# Patient Record
Sex: FEMALE | Race: WHITE | NOT HISPANIC OR LATINO | Employment: FULL TIME | URBAN - METROPOLITAN AREA
[De-identification: names, ages, dates, MRNs, and addresses within clinical notes are randomized per-mention and may not be internally consistent; named-entity substitution may affect disease eponyms.]

---

## 2020-01-22 ENCOUNTER — OFFICE VISIT (OUTPATIENT)
Dept: FAMILY MEDICINE CLINIC | Facility: CLINIC | Age: 40
End: 2020-01-22

## 2020-01-22 VITALS
BODY MASS INDEX: 29.47 KG/M2 | DIASTOLIC BLOOD PRESSURE: 68 MMHG | OXYGEN SATURATION: 97 % | TEMPERATURE: 98 F | SYSTOLIC BLOOD PRESSURE: 122 MMHG | HEART RATE: 66 BPM | HEIGHT: 67 IN | WEIGHT: 187.8 LBS

## 2020-01-22 DIAGNOSIS — R14.0 ABDOMINAL BLOATING: ICD-10-CM

## 2020-01-22 DIAGNOSIS — K64.4 EXTERNAL HEMORRHOIDS: Primary | ICD-10-CM

## 2020-01-22 DIAGNOSIS — R10.11 RIGHT UPPER QUADRANT ABDOMINAL PAIN: ICD-10-CM

## 2020-01-22 DIAGNOSIS — R11.2 NAUSEA AND VOMITING, INTRACTABILITY OF VOMITING NOT SPECIFIED, UNSPECIFIED VOMITING TYPE: ICD-10-CM

## 2020-01-22 DIAGNOSIS — D50.0 IRON DEFICIENCY ANEMIA DUE TO CHRONIC BLOOD LOSS: ICD-10-CM

## 2020-01-22 DIAGNOSIS — K59.00 CONSTIPATION, UNSPECIFIED CONSTIPATION TYPE: ICD-10-CM

## 2020-01-22 LAB
ALBUMIN SERPL-MCNC: 4.8 G/DL (ref 3.5–5.2)
ALBUMIN/GLOB SERPL: 2.4 G/DL
ALP SERPL-CCNC: 32 U/L (ref 39–117)
ALT SERPL-CCNC: 11 U/L (ref 1–33)
AMYLASE SERPL-CCNC: 42 U/L (ref 28–100)
AST SERPL-CCNC: 13 U/L (ref 1–32)
BASOPHILS # BLD AUTO: 0.06 10*3/MM3 (ref 0–0.2)
BASOPHILS NFR BLD AUTO: 1 % (ref 0–1.5)
BILIRUB SERPL-MCNC: 0.4 MG/DL (ref 0.2–1.2)
BUN SERPL-MCNC: 12 MG/DL (ref 6–20)
BUN/CREAT SERPL: 16.7 (ref 7–25)
CALCIUM SERPL-MCNC: 9.5 MG/DL (ref 8.6–10.5)
CHLORIDE SERPL-SCNC: 101 MMOL/L (ref 98–107)
CO2 SERPL-SCNC: 24.9 MMOL/L (ref 22–29)
CREAT SERPL-MCNC: 0.72 MG/DL (ref 0.57–1)
EOSINOPHIL # BLD AUTO: 0.06 10*3/MM3 (ref 0–0.4)
EOSINOPHIL NFR BLD AUTO: 1 % (ref 0.3–6.2)
ERYTHROCYTE [DISTWIDTH] IN BLOOD BY AUTOMATED COUNT: 12.6 % (ref 12.3–15.4)
GLOBULIN SER CALC-MCNC: 2 GM/DL
GLUCOSE SERPL-MCNC: 88 MG/DL (ref 65–99)
HCT VFR BLD AUTO: 41.8 % (ref 34–46.6)
HGB BLD-MCNC: 13.7 G/DL (ref 12–15.9)
IMM GRANULOCYTES # BLD AUTO: 0.02 10*3/MM3 (ref 0–0.05)
IMM GRANULOCYTES NFR BLD AUTO: 0.3 % (ref 0–0.5)
LIPASE SERPL-CCNC: 33 U/L (ref 13–60)
LYMPHOCYTES # BLD AUTO: 2.22 10*3/MM3 (ref 0.7–3.1)
LYMPHOCYTES NFR BLD AUTO: 35.2 % (ref 19.6–45.3)
MCH RBC QN AUTO: 28.5 PG (ref 26.6–33)
MCHC RBC AUTO-ENTMCNC: 32.8 G/DL (ref 31.5–35.7)
MCV RBC AUTO: 86.9 FL (ref 79–97)
MONOCYTES # BLD AUTO: 0.36 10*3/MM3 (ref 0.1–0.9)
MONOCYTES NFR BLD AUTO: 5.7 % (ref 5–12)
NEUTROPHILS # BLD AUTO: 3.59 10*3/MM3 (ref 1.7–7)
NEUTROPHILS NFR BLD AUTO: 56.8 % (ref 42.7–76)
NRBC BLD AUTO-RTO: 0 /100 WBC (ref 0–0.2)
PLATELET # BLD AUTO: 340 10*3/MM3 (ref 140–450)
POTASSIUM SERPL-SCNC: 4.4 MMOL/L (ref 3.5–5.2)
PROT SERPL-MCNC: 6.8 G/DL (ref 6–8.5)
RBC # BLD AUTO: 4.81 10*6/MM3 (ref 3.77–5.28)
SODIUM SERPL-SCNC: 140 MMOL/L (ref 136–145)
WBC # BLD AUTO: 6.31 10*3/MM3 (ref 3.4–10.8)

## 2020-01-22 PROCEDURE — 99214 OFFICE O/P EST MOD 30 MIN: CPT | Performed by: FAMILY MEDICINE

## 2020-01-22 RX ORDER — HYDROCORTISONE ACETATE 25 MG/1
25 SUPPOSITORY RECTAL 2 TIMES DAILY PRN
Qty: 12 SUPPOSITORY | Refills: 1 | Status: SHIPPED | OUTPATIENT
Start: 2020-01-22 | End: 2020-02-04

## 2020-01-22 NOTE — PATIENT INSTRUCTIONS
I have advised the patient to consider seeing GYN to have a more permanent solution to her iron deficiency anemia from her heavy periods.    I also recommended the patient use Benefiber daily to have at least 1 or 2 effortless bowel movements a day.      We will treat the patient's hemorrhoid topically but I advised her that if it does not improve over the next week that she should contact my office      High-Fiber Diet  Fiber, also called dietary fiber, is a type of carbohydrate that is found in fruits, vegetables, whole grains, and beans. A high-fiber diet can have many health benefits. Your health care provider may recommend a high-fiber diet to help:  · Prevent constipation. Fiber can make your bowel movements more regular.  · Lower your cholesterol.  · Relieve the following conditions:  ? Swelling of veins in the anus (hemorrhoids).  ? Swelling and irritation (inflammation) of specific areas of the digestive tract (uncomplicated diverticulosis).  ? A problem of the large intestine (colon) that sometimes causes pain and diarrhea (irritable bowel syndrome, IBS).  · Prevent overeating as part of a weight-loss plan.  · Prevent heart disease, type 2 diabetes, and certain cancers.  What is my plan?  The recommended daily fiber intake in grams (g) includes:  · 38 g for men age 50 or younger.  · 30 g for men over age 50.  · 25 g for women age 50 or younger.  · 21 g for women over age 50.  You can get the recommended daily intake of dietary fiber by:  · Eating a variety of fruits, vegetables, grains, and beans.  · Taking a fiber supplement, if it is not possible to get enough fiber through your diet.  What do I need to know about a high-fiber diet?  · It is better to get fiber through food sources rather than from fiber supplements. There is not a lot of research about how effective supplements are.  · Always check the fiber content on the nutrition facts label of any prepackaged food. Look for foods that contain 5 g of  fiber or more per serving.  · Talk with a diet and nutrition specialist (dietitian) if you have questions about specific foods that are recommended or not recommended for your medical condition, especially if those foods are not listed below.  · Gradually increase how much fiber you consume. If you increase your intake of dietary fiber too quickly, you may have bloating, cramping, or gas.  · Drink plenty of water. Water helps you to digest fiber.  What are tips for following this plan?  · Eat a wide variety of high-fiber foods.  · Make sure that half of the grains that you eat each day are whole grains.  · Eat breads and cereals that are made with whole-grain flour instead of refined flour or white flour.  · Eat brown rice, bulgur wheat, or millet instead of white rice.  · Start the day with a breakfast that is high in fiber, such as a cereal that contains 5 g of fiber or more per serving.  · Use beans in place of meat in soups, salads, and pasta dishes.  · Eat high-fiber snacks, such as berries, raw vegetables, nuts, and popcorn.  · Choose whole fruits and vegetables instead of processed forms like juice or sauce.  What foods can I eat?    Fruits  Berries. Pears. Apples. Oranges. Avocado. Prunes and raisins. Dried figs.  Vegetables  Sweet potatoes. Spinach. Kale. Artichokes. Cabbage. Broccoli. Cauliflower. Green peas. Carrots. Squash.  Grains  Whole-grain breads. Multigrain cereal. Oats and oatmeal. Brown rice. Barley. Bulgur wheat. Millet. Quinoa. Bran muffins. Popcorn. Rye wafer crackers.  Meats and other proteins  Navy, kidney, and rouse beans. Soybeans. Split peas. Lentils. Nuts and seeds.  Dairy  Fiber-fortified yogurt.  Beverages  Fiber-fortified soy milk. Fiber-fortified orange juice.  Other foods  Fiber bars.  The items listed above may not be a complete list of recommended foods and beverages. Contact a dietitian for more options.  What foods are not recommended?  Fruits  Fruit juice. Cooked, strained  fruit.  Vegetables  Fried potatoes. Canned vegetables. Well-cooked vegetables.  Grains  White bread. Pasta made with refined flour. White rice.  Meats and other proteins  Fatty cuts of meat. Fried chicken or fried fish.  Dairy  Milk. Yogurt. Cream cheese. Sour cream.  Fats and oils  [place].  Beverages  Soft drinks.  Other foods  Cakes and pastries.  The items listed above may not be a complete list of foods and beverages to avoid. Contact a dietitian for more information.  Summary  · Fiber is a type of carbohydrate. It is found in fruits, vegetables, whole grains, and beans.  · There are many health benefits of eating a high-fiber diet, such as preventing constipation, lowering blood cholesterol, helping with weight loss, and reducing your risk of heart disease, diabetes, and certain cancers.  · Gradually increase your intake of fiber. Increasing too fast can result in cramping, bloating, and gas. Drink plenty of water while you increase your fiber.  · The best sources of fiber include whole fruits and vegetables, whole grains, nuts, seeds, and beans.  This information is not intended to replace advice given to you by your health care provider. Make sure you discuss any questions you have with your health care provider.  Document Released: 12/18/2006 Document Revised: 10/22/2018 Document Reviewed: 10/22/2018  [employer] Interactive Patient Education © 2019 [employer]

## 2020-01-22 NOTE — PROGRESS NOTES
Subjective   Cele Alvarez is a 39 y.o. female.     Chief Complaint   Patient presents with   • Hemorrhoids       Pt is here today for a new problem.  She started with hemorrhoids about 7 years ago after the birth of her son.  The patient has a history of DU B and iron deficiency anemia and has also needed to take iron intermittently in her lifetime.  This is caused intermittent constipation which the patient has not necessarily offset with hydration and fiber.  So therefore she has suffered intermittently with hemorrhoids.  Sometimes they bleed but mostly they hurt.  She states that currently she has been having pain and difficulty when sitting down.  Patient last took iron 3 months ago.  LMP 1/5/2020, and she is still having monthly periods.    Patient is also experiencing new abdominal issues.  For the past couple of months she has had some intermittent episodes of abdominal pain.  Most of the episodes have occurred in the middle the night and wake her from sleep.  She feels abdominal pain and then feels that she needs to have a bowel movement.  However if she is unable to have a bowel movement she will get nauseous and has vomited at times.  Bowel movements do tend to help relieve the pain.  Patient states that overall she has had about 4 episodes as described above.  Also more commonly at other times after eating the patient will notice some bloating, abdominal pain, and increased gas.  These are to a lesser degree and occur more frequently than the episodes that occur in the middle the night.  Patient does still have her gallbladder.       Review of Systems   Constitutional: Negative for activity change, chills, fatigue and fever.   HENT: Negative for hearing loss, swollen glands, tinnitus and trouble swallowing.    Eyes: Negative for pain and visual disturbance.   Respiratory: Negative for cough and shortness of breath.    Cardiovascular: Negative for chest pain, palpitations and leg swelling.  "  Gastrointestinal: Positive for anal bleeding and constipation. Negative for diarrhea and nausea.   Endocrine: Negative for polydipsia and polyuria.   Genitourinary: Negative for difficulty urinating and urinary incontinence.   Musculoskeletal: Negative for arthralgias, gait problem and joint swelling.   Skin: Negative for rash.   Allergic/Immunologic: Negative for immunocompromised state.   Neurological: Negative for dizziness, light-headedness and headache.   Hematological: Negative for adenopathy. Does not bruise/bleed easily.   Psychiatric/Behavioral: Negative for dysphoric mood and sleep disturbance.       The following portions of the patient's history were reviewed and updated as appropriate: allergies, current medications, past family history, past medical history, past social history, past surgical history and problem list.    History reviewed. No pertinent past medical history.    History reviewed. No pertinent surgical history.    History reviewed. No pertinent family history.    Social History     Socioeconomic History   • Marital status:      Spouse name: Not on file   • Number of children: Not on file   • Years of education: Not on file   • Highest education level: Not on file   Tobacco Use   • Smoking status: Never Smoker   • Smokeless tobacco: Never Used   Substance and Sexual Activity   • Alcohol use: Never     Frequency: Never   • Drug use: Never         Current Outpatient Medications:   •  hydrocortisone (ANUSOL-HC) 25 MG suppository, Insert 1 suppository into the rectum 2 (Two) Times a Day As Needed for Hemorrhoids., Disp: 12 suppository, Rfl: 1    Objective     Vitals:    01/22/20 0823   BP: 122/68   Pulse: 66   Temp: 98 °F (36.7 °C)   SpO2: 97%   Weight: 85.2 kg (187 lb 12.8 oz)   Height: 170.2 cm (67\")       Body mass index is 29.41 kg/m².    No components found for: 2D    Physical Exam   Constitutional: She is oriented to person, place, and time. She appears well-developed and " well-nourished.   HENT:   Head: Normocephalic and atraumatic.   Eyes: Conjunctivae are normal.   Neck: Normal range of motion. Neck supple.   Cardiovascular: Normal rate, regular rhythm, normal heart sounds and intact distal pulses.   Pulmonary/Chest: Effort normal and breath sounds normal.   Abdominal: Soft. Bowel sounds are normal. She exhibits no distension and no mass. There is tenderness (Mild right upper quadrant tenderness to palpation.). There is no rebound and no guarding. No hernia.   Genitourinary: Rectal exam shows external hemorrhoid (1.5 cm nonthrombosed external hemorrhoid noted).   Musculoskeletal: Normal range of motion. She exhibits no edema.   Neurological: She is alert and oriented to person, place, and time.   Skin: Skin is warm and dry. Capillary refill takes less than 2 seconds. No rash noted.   Psychiatric: She has a normal mood and affect. Her behavior is normal. Judgment and thought content normal.   Nursing note and vitals reviewed.      Procedures    Assessment/Plan   Cele was seen today for hemorrhoids.    Diagnoses and all orders for this visit:    External hemorrhoids  -     hydrocortisone (ANUSOL-HC) 25 MG suppository; Insert 1 suppository into the rectum 2 (Two) Times a Day As Needed for Hemorrhoids.    Constipation, unspecified constipation type  -     US Gallbladder; Future    Iron deficiency anemia due to chronic blood loss  -     CBC & Differential    Right upper quadrant abdominal pain  -     Comprehensive Metabolic Panel  -     CBC & Differential  -     Lipase  -     Amylase  -     US Gallbladder; Future    Nausea and vomiting, intractability of vomiting not specified, unspecified vomiting type  -     US Gallbladder; Future    Abdominal bloating  -     US Gallbladder; Future        Patient Instructions   I have advised the patient to consider seeing GYN to have a more permanent solution to her iron deficiency anemia from her heavy periods.    I also recommended the patient use  Benefiber daily to have at least 1 or 2 effortless bowel movements a day.      We will treat the patient's hemorrhoid topically but I advised her that if it does not improve over the next week that she should contact my office      High-Fiber Diet  Fiber, also called dietary fiber, is a type of carbohydrate that is found in fruits, vegetables, whole grains, and beans. A high-fiber diet can have many health benefits. Your health care provider may recommend a high-fiber diet to help:  · Prevent constipation. Fiber can make your bowel movements more regular.  · Lower your cholesterol.  · Relieve the following conditions:  ? Swelling of veins in the anus (hemorrhoids).  ? Swelling and irritation (inflammation) of specific areas of the digestive tract (uncomplicated diverticulosis).  ? A problem of the large intestine (colon) that sometimes causes pain and diarrhea (irritable bowel syndrome, IBS).  · Prevent overeating as part of a weight-loss plan.  · Prevent heart disease, type 2 diabetes, and certain cancers.  What is my plan?  The recommended daily fiber intake in grams (g) includes:  · 38 g for men age 50 or younger.  · 30 g for men over age 50.  · 25 g for women age 50 or younger.  · 21 g for women over age 50.  You can get the recommended daily intake of dietary fiber by:  · Eating a variety of fruits, vegetables, grains, and beans.  · Taking a fiber supplement, if it is not possible to get enough fiber through your diet.  What do I need to know about a high-fiber diet?  · It is better to get fiber through food sources rather than from fiber supplements. There is not a lot of research about how effective supplements are.  · Always check the fiber content on the nutrition facts label of any prepackaged food. Look for foods that contain 5 g of fiber or more per serving.  · Talk with a diet and nutrition specialist (dietitian) if you have questions about specific foods that are recommended or not recommended for your  medical condition, especially if those foods are not listed below.  · Gradually increase how much fiber you consume. If you increase your intake of dietary fiber too quickly, you may have bloating, cramping, or gas.  · Drink plenty of water. Water helps you to digest fiber.  What are tips for following this plan?  · Eat a wide variety of high-fiber foods.  · Make sure that half of the grains that you eat each day are whole grains.  · Eat breads and cereals that are made with whole-grain flour instead of refined flour or white flour.  · Eat brown rice, bulgur wheat, or millet instead of white rice.  · Start the day with a breakfast that is high in fiber, such as a cereal that contains 5 g of fiber or more per serving.  · Use beans in place of meat in soups, salads, and pasta dishes.  · Eat high-fiber snacks, such as berries, raw vegetables, nuts, and popcorn.  · Choose whole fruits and vegetables instead of processed forms like juice or sauce.  What foods can I eat?    Fruits  Berries. Pears. Apples. Oranges. Avocado. Prunes and raisins. Dried figs.  Vegetables  Sweet potatoes. Spinach. Kale. Artichokes. Cabbage. Broccoli. Cauliflower. Green peas. Carrots. Squash.  Grains  Whole-grain breads. Multigrain cereal. Oats and oatmeal. Brown rice. Barley. Bulgur wheat. Millet. Quinoa. Bran muffins. Popcorn. Rye wafer crackers.  Meats and other proteins  Navy, kidney, and rouse beans. Soybeans. Split peas. Lentils. Nuts and seeds.  Dairy  Fiber-fortified yogurt.  Beverages  Fiber-fortified soy milk. Fiber-fortified orange juice.  Other foods  Fiber bars.  The items listed above may not be a complete list of recommended foods and beverages. Contact a dietitian for more options.  What foods are not recommended?  Fruits  Fruit juice. Cooked, strained fruit.  Vegetables  Fried potatoes. Canned vegetables. Well-cooked vegetables.  Grains  White bread. Pasta made with refined flour. White rice.  Meats and other proteins  Fatty  cuts of meat. Fried chicken or fried fish.  Dairy  Milk. Yogurt. Cream cheese. Sour cream.  Fats and oils  [place].  Beverages  Soft drinks.  Other foods  Cakes and pastries.  The items listed above may not be a complete list of foods and beverages to avoid. Contact a dietitian for more information.  Summary  · Fiber is a type of carbohydrate. It is found in fruits, vegetables, whole grains, and beans.  · There are many health benefits of eating a high-fiber diet, such as preventing constipation, lowering blood cholesterol, helping with weight loss, and reducing your risk of heart disease, diabetes, and certain cancers.  · Gradually increase your intake of fiber. Increasing too fast can result in cramping, bloating, and gas. Drink plenty of water while you increase your fiber.  · The best sources of fiber include whole fruits and vegetables, whole grains, nuts, seeds, and beans.  This information is not intended to replace advice given to you by your health care provider. Make sure you discuss any questions you have with your health care provider.  Document Released: 12/18/2006 Document Revised: 10/22/2018 Document Reviewed: 10/22/2018  [employer] Interactive Patient Education © 2019 [employer]

## 2020-01-28 ENCOUNTER — HOSPITAL ENCOUNTER (OUTPATIENT)
Dept: ULTRASOUND IMAGING | Facility: HOSPITAL | Age: 40
Discharge: HOME | End: 2020-01-28
Admitting: FAMILY MEDICINE

## 2020-01-28 DIAGNOSIS — R10.11 RIGHT UPPER QUADRANT ABDOMINAL PAIN: ICD-10-CM

## 2020-01-28 DIAGNOSIS — R11.2 NAUSEA AND VOMITING, INTRACTABILITY OF VOMITING NOT SPECIFIED, UNSPECIFIED VOMITING TYPE: ICD-10-CM

## 2020-01-28 DIAGNOSIS — R14.0 ABDOMINAL BLOATING: ICD-10-CM

## 2020-01-28 DIAGNOSIS — K59.00 CONSTIPATION, UNSPECIFIED CONSTIPATION TYPE: ICD-10-CM

## 2020-01-28 PROCEDURE — 76705 ECHO EXAM OF ABDOMEN: CPT

## 2020-02-04 ENCOUNTER — OFFICE VISIT (OUTPATIENT)
Dept: FAMILY MEDICINE CLINIC | Facility: CLINIC | Age: 40
End: 2020-02-04

## 2020-02-04 VITALS
BODY MASS INDEX: 28.79 KG/M2 | HEIGHT: 67 IN | TEMPERATURE: 98.5 F | OXYGEN SATURATION: 97 % | DIASTOLIC BLOOD PRESSURE: 72 MMHG | HEART RATE: 65 BPM | SYSTOLIC BLOOD PRESSURE: 116 MMHG | WEIGHT: 183.4 LBS

## 2020-02-04 DIAGNOSIS — K76.0 FATTY LIVER: Primary | ICD-10-CM

## 2020-02-04 PROCEDURE — 99213 OFFICE O/P EST LOW 20 MIN: CPT | Performed by: FAMILY MEDICINE

## 2020-02-04 NOTE — PATIENT INSTRUCTIONS
I feel like the patient's current GI symptoms are related to dietary choices and fatty liver disease.  We had a long discussion about the prognosis and treatment for fatty liver disease being diet and exercise.  If the patient chooses not to follow the treatment plan she is aware that fatty liver disease could progress to cirrhosis.      Fatty Liver Disease    Fatty liver disease occurs when too much fat has built up in your liver cells. Fatty liver disease is also called hepatic steatosis or steatohepatitis. The liver removes harmful substances from your bloodstream and produces fluids that your body needs. It also helps your body use and store energy from the food you eat.  In many cases, fatty liver disease does not cause symptoms or problems. It is often diagnosed when tests are being done for other reasons. However, over time, fatty liver can cause inflammation that may lead to more serious liver problems, such as scarring of the liver (cirrhosis) and liver failure.  Fatty liver is associated with insulin resistance, increased body fat, high blood pressure (hypertension), and high cholesterol. These are features of metabolic syndrome and increase your risk for stroke, diabetes, and heart disease.  What are the causes?  This condition may be caused by:  · Drinking too much alcohol.  · Poor nutrition.  · Obesity.  · Cushing's syndrome.  · Diabetes.  · High cholesterol.  · Certain drugs.  · Poisons.  · Some viral infections.  · Pregnancy.  What increases the risk?  You are more likely to develop this condition if you:  · Abuse alcohol.  · Are overweight.  · Have diabetes.  · Have hepatitis.  · Have a high triglyceride level.  · Are pregnant.  What are the signs or symptoms?  Fatty liver disease often does not cause symptoms. If symptoms do develop, they can include:  · Fatigue.  · Weakness.  · Weight loss.  · Confusion.  · Abdominal pain.  · Nausea and vomiting.  · Yellowing of your skin and the white parts of  your eyes (jaundice).  · Itchy skin.  How is this diagnosed?  This condition may be diagnosed by:  · A physical exam and medical history.  · Blood tests.  · Imaging tests, such as an ultrasound, CT scan, or MRI.  · A liver biopsy. A small sample of liver tissue is removed using a needle. The sample is then looked at under a microscope.  How is this treated?  Fatty liver disease is often caused by other health conditions. Treatment for fatty liver may involve medicines and lifestyle changes to manage conditions such as:  · Alcoholism.  · High cholesterol.  · Diabetes.  · Being overweight or obese.  Follow these instructions at home:    · Do not drink alcohol. If you have trouble quitting, ask your health care provider how to safely quit with the help of medicine or a supervised program. This is important to keep your condition from getting worse.  · Eat a healthy diet as told by your health care provider. Ask your health care provider about working with a diet and nutrition specialist (dietitian) to develop an eating plan.  · Exercise regularly. This can help you lose weight and control your cholesterol and diabetes. Talk to your health care provider about an exercise plan and which activities are best for you.  · Take over-the-counter and prescription medicines only as told by your health care provider.  · Keep all follow-up visits as told by your health care provider. This is important.  Contact a health care provider if:  You have trouble controlling your:  · Blood sugar. This is especially important if you have diabetes.  · Cholesterol.  · Drinking of alcohol.  Get help right away if:  · You have abdominal pain.  · You have jaundice.  · You have nausea and vomiting.  · You vomit blood or material that looks like coffee grounds.  · You have stools that are black, tar-like, or bloody.  Summary  · Fatty liver disease develops when too much fat builds up in the cells of your liver.  · Fatty liver disease often causes  no symptoms or problems. However, over time, fatty liver can cause inflammation that may lead to more serious liver problems, such as scarring of the liver (cirrhosis).  · You are more likely to develop this condition if you abuse alcohol, are pregnant, are overweight, have diabetes, have hepatitis, or have high triglyceride levels.  · Contact your health care provider if you have trouble controlling your weight, blood sugar, cholesterol, or drinking of alcohol.  This information is not intended to replace advice given to you by your health care provider. Make sure you discuss any questions you have with your health care provider.  Document Released: 02/02/2007 Document Revised: 09/26/2018 Document Reviewed: 09/26/2018  [employer] Interactive Patient Education © 2019 [employer]

## 2020-02-04 NOTE — PROGRESS NOTES
Subjective   Cele Alvarez is a 39 y.o. female.     Chief Complaint   Patient presents with   • Follow-up     ultrasound fatty liver       Patient is here to follow-up on ultrasound results.  She was seen a couple of weeks ago for some GI issues.  Please see my note from January 22, 2020.  As part of the work-up I ordered a gallbladder ultrasound and labs.  Labs are all normal but patient is here to discuss the ultrasound because fatty liver was noted.       Review of Systems   Constitutional: Negative for activity change, chills, fatigue and fever.   HENT: Negative for hearing loss, swollen glands, tinnitus and trouble swallowing.    Eyes: Negative for pain and visual disturbance.   Respiratory: Negative for cough and shortness of breath.    Cardiovascular: Negative for chest pain, palpitations and leg swelling.   Gastrointestinal: Negative for diarrhea and nausea.   Endocrine: Negative for polydipsia and polyuria.   Genitourinary: Negative for difficulty urinating and urinary incontinence.   Musculoskeletal: Negative for arthralgias, gait problem and joint swelling.   Skin: Negative for rash.   Allergic/Immunologic: Negative for immunocompromised state.   Neurological: Negative for dizziness, light-headedness and headache.   Hematological: Negative for adenopathy. Does not bruise/bleed easily.   Psychiatric/Behavioral: Negative for dysphoric mood and sleep disturbance.       The following portions of the patient's history were reviewed and updated as appropriate: allergies, current medications, past family history, past medical history, past social history, past surgical history and problem list.    Past Medical History:   Diagnosis Date   • Acute upper respiratory infection    • Allergic rhinitis    • Breast lump in female    • Breast pain    • Encounter for preventive health examination    • Family history of diabetes mellitus     Father   • Family history of hypothyroidism     Mother   • Fatigue    • Former  "smoker     quit    • Iron deficiency anemia due to chronic blood loss    • Low hemoglobin and low hematocrit    • Overweight    • Personal history of arthritis     Neck and shoulders   • Screening for diabetes mellitus    • Seizure (CMS/HCC)    • Sore throat    • Tendonitis of wrist, right    • Thyroid disorder screening    • Urinary tract infection, acute    • UTI symptoms        Past Surgical History:   Procedure Laterality Date   • KNEE ARTHROSCOPY Right    • KNEE ARTHROSCOPY         Family History   Problem Relation Age of Onset   • Hypothyroidism Mother    • Diabetes Father    • Hypertension Father    • Pancreatitis Paternal Grandfather    • No Known Problems Other        Social History     Socioeconomic History   • Marital status:      Spouse name: Not on file   • Number of children: Not on file   • Years of education: Not on file   • Highest education level: Not on file   Tobacco Use   • Smoking status: Former Smoker     Last attempt to quit:      Years since quittin.0   • Smokeless tobacco: Never Used   • Tobacco comment: quit    Substance and Sexual Activity   • Alcohol use: Never     Frequency: Never   • Drug use: Never       No current outpatient medications on file.    Objective     Vitals:    20 0802   BP: 116/72   Pulse: 65   Temp: 98.5 °F (36.9 °C)   SpO2: 97%   Weight: 83.2 kg (183 lb 6.4 oz)   Height: 170.2 cm (67\")       Body mass index is 28.72 kg/m².    No components found for: 2D    Physical Exam   Constitutional: She is oriented to person, place, and time. She appears well-developed and well-nourished.   HENT:   Head: Normocephalic and atraumatic.   Eyes: Conjunctivae are normal.   Neck: Normal range of motion. Neck supple.   Cardiovascular: Normal rate, regular rhythm, normal heart sounds and intact distal pulses.   Pulmonary/Chest: Effort normal and breath sounds normal.   Abdominal: Soft. Bowel sounds are normal.   Musculoskeletal: Normal range of motion. She " exhibits no edema.   Neurological: She is alert and oriented to person, place, and time.   Skin: Skin is warm and dry. Capillary refill takes less than 2 seconds. No rash noted.   Psychiatric: She has a normal mood and affect. Her behavior is normal. Judgment and thought content normal.   Nursing note and vitals reviewed.      Procedures    Assessment/Plan   Cele was seen today for follow-up.    Diagnoses and all orders for this visit:    Fatty liver        Patient Instructions   I feel like the patient's current GI symptoms are related to dietary choices and fatty liver disease.  We had a long discussion about the prognosis and treatment for fatty liver disease being diet and exercise.  If the patient chooses not to follow the treatment plan she is aware that fatty liver disease could progress to cirrhosis.      Fatty Liver Disease    Fatty liver disease occurs when too much fat has built up in your liver cells. Fatty liver disease is also called hepatic steatosis or steatohepatitis. The liver removes harmful substances from your bloodstream and produces fluids that your body needs. It also helps your body use and store energy from the food you eat.  In many cases, fatty liver disease does not cause symptoms or problems. It is often diagnosed when tests are being done for other reasons. However, over time, fatty liver can cause inflammation that may lead to more serious liver problems, such as scarring of the liver (cirrhosis) and liver failure.  Fatty liver is associated with insulin resistance, increased body fat, high blood pressure (hypertension), and high cholesterol. These are features of metabolic syndrome and increase your risk for stroke, diabetes, and heart disease.  What are the causes?  This condition may be caused by:  · Drinking too much alcohol.  · Poor nutrition.  · Obesity.  · Cushing's syndrome.  · Diabetes.  · High cholesterol.  · Certain drugs.  · Poisons.  · Some viral  infections.  · Pregnancy.  What increases the risk?  You are more likely to develop this condition if you:  · Abuse alcohol.  · Are overweight.  · Have diabetes.  · Have hepatitis.  · Have a high triglyceride level.  · Are pregnant.  What are the signs or symptoms?  Fatty liver disease often does not cause symptoms. If symptoms do develop, they can include:  · Fatigue.  · Weakness.  · Weight loss.  · Confusion.  · Abdominal pain.  · Nausea and vomiting.  · Yellowing of your skin and the white parts of your eyes (jaundice).  · Itchy skin.  How is this diagnosed?  This condition may be diagnosed by:  · A physical exam and medical history.  · Blood tests.  · Imaging tests, such as an ultrasound, CT scan, or MRI.  · A liver biopsy. A small sample of liver tissue is removed using a needle. The sample is then looked at under a microscope.  How is this treated?  Fatty liver disease is often caused by other health conditions. Treatment for fatty liver may involve medicines and lifestyle changes to manage conditions such as:  · Alcoholism.  · High cholesterol.  · Diabetes.  · Being overweight or obese.  Follow these instructions at home:    · Do not drink alcohol. If you have trouble quitting, ask your health care provider how to safely quit with the help of medicine or a supervised program. This is important to keep your condition from getting worse.  · Eat a healthy diet as told by your health care provider. Ask your health care provider about working with a diet and nutrition specialist (dietitian) to develop an eating plan.  · Exercise regularly. This can help you lose weight and control your cholesterol and diabetes. Talk to your health care provider about an exercise plan and which activities are best for you.  · Take over-the-counter and prescription medicines only as told by your health care provider.  · Keep all follow-up visits as told by your health care provider. This is important.  Contact a health care provider  if:  You have trouble controlling your:  · Blood sugar. This is especially important if you have diabetes.  · Cholesterol.  · Drinking of alcohol.  Get help right away if:  · You have abdominal pain.  · You have jaundice.  · You have nausea and vomiting.  · You vomit blood or material that looks like coffee grounds.  · You have stools that are black, tar-like, or bloody.  Summary  · Fatty liver disease develops when too much fat builds up in the cells of your liver.  · Fatty liver disease often causes no symptoms or problems. However, over time, fatty liver can cause inflammation that may lead to more serious liver problems, such as scarring of the liver (cirrhosis).  · You are more likely to develop this condition if you abuse alcohol, are pregnant, are overweight, have diabetes, have hepatitis, or have high triglyceride levels.  · Contact your health care provider if you have trouble controlling your weight, blood sugar, cholesterol, or drinking of alcohol.  This information is not intended to replace advice given to you by your health care provider. Make sure you discuss any questions you have with your health care provider.  Document Released: 02/02/2007 Document Revised: 09/26/2018 Document Reviewed: 09/26/2018  [employer] Patient Education © 2019 [employer]

## 2020-10-20 ENCOUNTER — PREP FOR SURGERY (OUTPATIENT)
Dept: OTHER | Facility: HOSPITAL | Age: 40
End: 2020-10-20

## 2020-10-20 ENCOUNTER — OFFICE VISIT (OUTPATIENT)
Dept: OBSTETRICS AND GYNECOLOGY | Age: 40
End: 2020-10-20

## 2020-10-20 VITALS
WEIGHT: 194 LBS | BODY MASS INDEX: 30.45 KG/M2 | DIASTOLIC BLOOD PRESSURE: 82 MMHG | HEIGHT: 67 IN | SYSTOLIC BLOOD PRESSURE: 118 MMHG

## 2020-10-20 DIAGNOSIS — Z01.419 ENCOUNTER FOR GYNECOLOGICAL EXAMINATION WITHOUT ABNORMAL FINDING: Primary | ICD-10-CM

## 2020-10-20 DIAGNOSIS — N93.9 ABNORMAL UTERINE BLEEDING: Primary | ICD-10-CM

## 2020-10-20 DIAGNOSIS — N93.9 ABNORMAL UTERINE BLEEDING: ICD-10-CM

## 2020-10-20 PROCEDURE — 99385 PREV VISIT NEW AGE 18-39: CPT | Performed by: OBSTETRICS & GYNECOLOGY

## 2020-10-20 PROCEDURE — 99213 OFFICE O/P EST LOW 20 MIN: CPT | Performed by: OBSTETRICS & GYNECOLOGY

## 2020-10-20 RX ORDER — SODIUM CHLORIDE 0.9 % (FLUSH) 0.9 %
3 SYRINGE (ML) INJECTION EVERY 12 HOURS SCHEDULED
Status: CANCELLED | OUTPATIENT
Start: 2020-11-13

## 2020-10-20 RX ORDER — SODIUM CHLORIDE 0.9 % (FLUSH) 0.9 %
10 SYRINGE (ML) INJECTION AS NEEDED
Status: CANCELLED | OUTPATIENT
Start: 2020-11-13

## 2020-10-20 NOTE — PROGRESS NOTES
Routine Annual Visit    10/20/2020    Patient: Cele Alvarez          MR#:[id number]      Chief Complaint   Patient presents with   • Gynecologic Exam     New pt. desires to dicuss ablation. last pap 17(-) MG 20         History of Present Illness    40 y.o. female  who presents for annual exam. She has heavy menses that are heavy. The first few days she may soak through heavy pad or have flooding. Often afraid of soiling clothing.  She has not done well with oral contraceptives, she gained a lot of weight on Depo.  She had issue with an IUD as well.  She has considered her options already and is most interested in an ablation.  She uses her 's vasectomy for contraception, extremely unlikely that she would have another partner in future.    Health Maintenance  Last pap: , history abnormal: no  Mammogram: 2020  Family history of Breast, ovarian, uterine, colon, pancreatic cancer: yes - MGM breast ca  PCP Elenita Lizama    Current contraception: vasectomy    Patient's last menstrual period was 10/19/2020 (exact date).  Obstetric History:  OB History        1    Para   1    Term                AB        Living   1       SAB        TAB        Ectopic        Molar        Multiple        Live Births   1               Menstrual History:     Patient's last menstrual period was 10/19/2020 (exact date).       ________________________________________  There is no problem list on file for this patient.      Past Medical History:   Diagnosis Date   • Acute upper respiratory infection    • Allergic rhinitis    • Breast lump in female    • Breast pain    • Encounter for preventive health examination    • Family history of diabetes mellitus     Father   • Family history of hypothyroidism     Mother   • Fatigue    • Former smoker     quit    • Iron deficiency anemia due to chronic blood loss    • Low hemoglobin and low hematocrit    • Overweight    • Personal history of arthritis     Neck  "and shoulders   • Screening for diabetes mellitus    • Seizure (CMS/HCC)    • Sore throat    • Tendonitis of wrist, right    • Thyroid disorder screening    • Urinary tract infection, acute    • UTI symptoms        Family History   Problem Relation Age of Onset   • Hypothyroidism Mother    • Diabetes Father    • Hypertension Father    • Pancreatitis Paternal Grandfather    • No Known Problems Other        Past Surgical History:   Procedure Laterality Date   • KNEE ARTHROSCOPY Right    • KNEE ARTHROSCOPY         Social History     Tobacco Use   Smoking Status Former Smoker   • Quit date:    • Years since quittin.8   Smokeless Tobacco Never Used   Tobacco Comment    quit 2012       currently has no medications in their medication list.  ________________________________________      Review of Systems   Constitutional: Negative for fever and unexpected weight change.   Respiratory: Negative for shortness of breath.    Cardiovascular: Negative for chest pain.   Gastrointestinal: Negative for abdominal pain, constipation and diarrhea.   Genitourinary: Positive for menstrual problem. Negative for frequency and urgency.   Hematological: Negative for adenopathy.   Psychiatric/Behavioral: Negative for dysphoric mood.       Objective   Physical Exam    /82   Ht 170.2 cm (67\")   Wt 88 kg (194 lb)   LMP 10/19/2020 (Exact Date)   Breastfeeding No   BMI 30.38 kg/m²    BP Readings from Last 3 Encounters:   10/20/20 118/82   20 116/72   20 122/68      Wt Readings from Last 3 Encounters:   10/20/20 88 kg (194 lb)   20 83.2 kg (183 lb 6.4 oz)   20 85.2 kg (187 lb 12.8 oz)         BMI: Body mass index is 30.38 kg/m².       General:   alert, appears stated age and cooperative   Neck: No thyromegaly or LAD, no carotid bruit noted   Heart:: regular rate and rhythm, S1, S2 normal, no murmur, click, rub or gallop   Lungs: normal respiratory effort and auscultation   Abdomen: soft, non-tender, " without masses or organomegaly   Breast: inspection negative, no nipple discharge or bleeding, no masses or nodularity palpable   Urethra and bladder: urethral meatus normal; bladder nontender to palpation;   Vulva: normal, Bartholin's, Urethra, [place]'s normal   Vagina: normal mucosa, normal discharge   Cervix: anteverted and multiparous appearance   Uterus: normal size or mid plane    Adnexa: normal adnexa and no mass, fullness, tenderness       Assessment:    normal annual exam   Heavy menstrual bleeding    Plan:    Plan     []  Mammogram request made  [x]  PAP done  []  Labs:   []  GC/Chl/TV  []  DEXA scan   []  Referral for colonoscopy:     We reviewed all options for managing heavy menstrual bleeding and she desires endometrial ablation.  She does need further work-up with transvaginal ultrasound and plan to return for this and also endometrial biopsy is required prior to endometrial ablation.    We reviewed all of the risks of surgery including anesthesia risks, uterine perforation, bleeding, thermal injury to surrounding structures such as bowel, bladder, vagina.  Written information was given today about hysteroscopy, D&C with NovaSure.     Counseling  []  Nutrition  [x]  Physical activity/regular exercise   [x]  Healthy weight  []  Injury prevention  []  Smoking cessation  []  Substance misuse/abuse  [x]  Sexual behavior  []  STD prevention  [x]  Contraception  []  Dental health  []  Mental health  []  Immunization  [x]  Encouraged SBE      Patient's BMI is Body mass index is 30.38 kg/m²., which is classified as obese. We discussed health consequences of obesity and recommended weight loss. I counseled regarding diet modifications and exercise in order to reach weight loss goal.     Rissa Burton MD  10/20/2020  09:40 EDT

## 2020-10-26 LAB
CYTOLOGIST CVX/VAG CYTO: NORMAL
CYTOLOGY CVX/VAG DOC CYTO: NORMAL
CYTOLOGY CVX/VAG DOC THIN PREP: NORMAL
DX ICD CODE: NORMAL
HIV 1 & 2 AB SER-IMP: NORMAL
HPV I/H RISK 4 DNA CVX QL PROBE+SIG AMP: NEGATIVE
OTHER STN SPEC: NORMAL
STAT OF ADQ CVX/VAG CYTO-IMP: NORMAL

## 2020-11-03 ENCOUNTER — PROCEDURE VISIT (OUTPATIENT)
Dept: OBSTETRICS AND GYNECOLOGY | Age: 40
End: 2020-11-03

## 2020-11-03 ENCOUNTER — OFFICE VISIT (OUTPATIENT)
Dept: OBSTETRICS AND GYNECOLOGY | Age: 40
End: 2020-11-03

## 2020-11-03 VITALS
SYSTOLIC BLOOD PRESSURE: 130 MMHG | BODY MASS INDEX: 30.13 KG/M2 | HEIGHT: 67 IN | WEIGHT: 192 LBS | DIASTOLIC BLOOD PRESSURE: 82 MMHG

## 2020-11-03 DIAGNOSIS — N92.4 EXCESSIVE BLEEDING IN PREMENOPAUSAL PERIOD: ICD-10-CM

## 2020-11-03 DIAGNOSIS — N93.9 ABNORMAL UTERINE BLEEDING (AUB): ICD-10-CM

## 2020-11-03 DIAGNOSIS — N93.9 ABNORMAL UTERINE BLEEDING: Primary | ICD-10-CM

## 2020-11-03 DIAGNOSIS — Z13.9 SPECIAL SCREENING: Primary | ICD-10-CM

## 2020-11-03 LAB
B-HCG UR QL: NEGATIVE
INTERNAL NEGATIVE CONTROL: NEGATIVE
INTERNAL POSITIVE CONTROL: POSITIVE
Lab: NORMAL

## 2020-11-03 PROCEDURE — 76830 TRANSVAGINAL US NON-OB: CPT | Performed by: OBSTETRICS & GYNECOLOGY

## 2020-11-03 PROCEDURE — 58100 BIOPSY OF UTERUS LINING: CPT | Performed by: OBSTETRICS & GYNECOLOGY

## 2020-11-03 PROCEDURE — 81025 URINE PREGNANCY TEST: CPT | Performed by: OBSTETRICS & GYNECOLOGY

## 2020-11-03 NOTE — PROGRESS NOTES
Cele Alvarez  1980  [id number]    Endometrial biopsy      Informed consent was obtained and risks described as bleeding, cramping, uterine perforation, infection. She desired to proceed. A speculum was inserted and the cervix visualized and cleansed with betadine. A single tooth tenaculum was used to grasp the cervix. The pipelle was inserted and the plunger pulled back. The pipelle was rotated and withdrawn from the uterus with return of moderate amount of endometrial tissue. A total of 3 passes were made with the pipelle. The tenaculum was then removed and hemostasis of the sites ensured. The patient tolerated the procedure well. Tissue recovered was sent for pathologic examination.    Rissa Burton MD  11/3/2020  08:50 EST

## 2020-11-05 LAB
DX ICD CODE: NORMAL
PATH REPORT.FINAL DX SPEC: NORMAL
PATH REPORT.GROSS SPEC: NORMAL
PATH REPORT.SITE OF ORIGIN SPEC: NORMAL
PATHOLOGIST NAME: NORMAL
PAYMENT PROCEDURE: NORMAL

## 2020-11-05 NOTE — PROGRESS NOTES
Please notify her that her endometrial biopsy was normal, no hyperplasia or cancer.  There was maybe a small benign polyp that they noticed, which we can remove at the time of surgery

## 2020-11-11 ENCOUNTER — APPOINTMENT (OUTPATIENT)
Dept: PREADMISSION TESTING | Facility: HOSPITAL | Age: 40
End: 2020-11-11

## 2020-11-11 VITALS
BODY MASS INDEX: 29.85 KG/M2 | SYSTOLIC BLOOD PRESSURE: 150 MMHG | DIASTOLIC BLOOD PRESSURE: 92 MMHG | WEIGHT: 190.2 LBS | TEMPERATURE: 97.9 F | OXYGEN SATURATION: 99 % | RESPIRATION RATE: 16 BRPM | HEART RATE: 73 BPM | HEIGHT: 67 IN

## 2020-11-11 LAB
DEPRECATED RDW RBC AUTO: 38.6 FL (ref 37–54)
ERYTHROCYTE [DISTWIDTH] IN BLOOD BY AUTOMATED COUNT: 12.5 % (ref 12.3–15.4)
HCG SERPL QL: NEGATIVE
HCT VFR BLD AUTO: 40.2 % (ref 34–46.6)
HGB BLD-MCNC: 13.7 G/DL (ref 12–15.9)
MCH RBC QN AUTO: 29.3 PG (ref 26.6–33)
MCHC RBC AUTO-ENTMCNC: 34.1 G/DL (ref 31.5–35.7)
MCV RBC AUTO: 85.9 FL (ref 79–97)
PLATELET # BLD AUTO: 292 10*3/MM3 (ref 140–450)
PMV BLD AUTO: 9.1 FL (ref 6–12)
RBC # BLD AUTO: 4.68 10*6/MM3 (ref 3.77–5.28)
WBC # BLD AUTO: 7.14 10*3/MM3 (ref 3.4–10.8)

## 2020-11-11 PROCEDURE — 85027 COMPLETE CBC AUTOMATED: CPT

## 2020-11-11 PROCEDURE — U0004 COV-19 TEST NON-CDC HGH THRU: HCPCS

## 2020-11-11 PROCEDURE — 36415 COLL VENOUS BLD VENIPUNCTURE: CPT

## 2020-11-11 PROCEDURE — 84703 CHORIONIC GONADOTROPIN ASSAY: CPT

## 2020-11-11 PROCEDURE — C9803 HOPD COVID-19 SPEC COLLECT: HCPCS

## 2020-11-12 LAB — SARS-COV-2 RNA RESP QL NAA+PROBE: NOT DETECTED

## 2020-11-13 ENCOUNTER — HOSPITAL ENCOUNTER (OUTPATIENT)
Facility: HOSPITAL | Age: 40
Setting detail: HOSPITAL OUTPATIENT SURGERY
Discharge: HOME | End: 2020-11-13
Attending: OBSTETRICS & GYNECOLOGY | Admitting: OBSTETRICS & GYNECOLOGY

## 2020-11-13 ENCOUNTER — ANESTHESIA EVENT (OUTPATIENT)
Dept: PERIOP | Facility: HOSPITAL | Age: 40
End: 2020-11-13

## 2020-11-13 ENCOUNTER — ANESTHESIA (OUTPATIENT)
Dept: PERIOP | Facility: HOSPITAL | Age: 40
End: 2020-11-13

## 2020-11-13 VITALS
SYSTOLIC BLOOD PRESSURE: 144 MMHG | RESPIRATION RATE: 16 BRPM | TEMPERATURE: 97.8 F | HEART RATE: 63 BPM | OXYGEN SATURATION: 100 % | WEIGHT: 185.85 LBS | DIASTOLIC BLOOD PRESSURE: 100 MMHG | BODY MASS INDEX: 29.11 KG/M2

## 2020-11-13 DIAGNOSIS — Z98.890 S/P ENDOMETRIAL ABLATION: Primary | ICD-10-CM

## 2020-11-13 DIAGNOSIS — N93.9 ABNORMAL UTERINE BLEEDING: ICD-10-CM

## 2020-11-13 PROCEDURE — S0260 H&P FOR SURGERY: HCPCS | Performed by: OBSTETRICS & GYNECOLOGY

## 2020-11-13 PROCEDURE — 25010000002 PROPOFOL 10 MG/ML EMULSION: Performed by: NURSE ANESTHETIST, CERTIFIED REGISTERED

## 2020-11-13 PROCEDURE — 25010000002 KETOROLAC TROMETHAMINE PER 15 MG: Performed by: NURSE ANESTHETIST, CERTIFIED REGISTERED

## 2020-11-13 PROCEDURE — 25010000002 MIDAZOLAM PER 1 MG: Performed by: ANESTHESIOLOGY

## 2020-11-13 PROCEDURE — 25010000002 FENTANYL CITRATE (PF) 100 MCG/2ML SOLUTION: Performed by: NURSE ANESTHETIST, CERTIFIED REGISTERED

## 2020-11-13 PROCEDURE — 25010000002 ONDANSETRON PER 1 MG: Performed by: NURSE ANESTHETIST, CERTIFIED REGISTERED

## 2020-11-13 PROCEDURE — 58563 HYSTEROSCOPY ABLATION: CPT | Performed by: OBSTETRICS & GYNECOLOGY

## 2020-11-13 PROCEDURE — 25010000002 DEXAMETHASONE PER 1 MG: Performed by: NURSE ANESTHETIST, CERTIFIED REGISTERED

## 2020-11-13 RX ORDER — PROMETHAZINE HYDROCHLORIDE 25 MG/1
25 TABLET ORAL ONCE AS NEEDED
Status: DISCONTINUED | OUTPATIENT
Start: 2020-11-13 | End: 2020-11-13 | Stop reason: HOSPADM

## 2020-11-13 RX ORDER — HYDROMORPHONE HYDROCHLORIDE 1 MG/ML
0.5 INJECTION, SOLUTION INTRAMUSCULAR; INTRAVENOUS; SUBCUTANEOUS
Status: DISCONTINUED | OUTPATIENT
Start: 2020-11-13 | End: 2020-11-13 | Stop reason: HOSPADM

## 2020-11-13 RX ORDER — FENTANYL CITRATE 50 UG/ML
50 INJECTION, SOLUTION INTRAMUSCULAR; INTRAVENOUS
Status: DISCONTINUED | OUTPATIENT
Start: 2020-11-13 | End: 2020-11-13 | Stop reason: HOSPADM

## 2020-11-13 RX ORDER — SODIUM CHLORIDE, SODIUM LACTATE, POTASSIUM CHLORIDE, CALCIUM CHLORIDE 600; 310; 30; 20 MG/100ML; MG/100ML; MG/100ML; MG/100ML
9 INJECTION, SOLUTION INTRAVENOUS CONTINUOUS PRN
Status: DISCONTINUED | OUTPATIENT
Start: 2020-11-13 | End: 2020-11-13 | Stop reason: HOSPADM

## 2020-11-13 RX ORDER — FAMOTIDINE 10 MG/ML
20 INJECTION, SOLUTION INTRAVENOUS
Status: COMPLETED | OUTPATIENT
Start: 2020-11-13 | End: 2020-11-13

## 2020-11-13 RX ORDER — HYDROCODONE BITARTRATE AND ACETAMINOPHEN 5; 325 MG/1; MG/1
1-2 TABLET ORAL EVERY 4 HOURS PRN
Qty: 7 TABLET | Refills: 0 | Status: SHIPPED | OUTPATIENT
Start: 2020-11-13 | End: 2020-11-24

## 2020-11-13 RX ORDER — HYDROCODONE BITARTRATE AND ACETAMINOPHEN 7.5; 325 MG/1; MG/1
1 TABLET ORAL ONCE AS NEEDED
Status: COMPLETED | OUTPATIENT
Start: 2020-11-13 | End: 2020-11-13

## 2020-11-13 RX ORDER — ACETAMINOPHEN 325 MG/1
650 TABLET ORAL ONCE AS NEEDED
Status: DISCONTINUED | OUTPATIENT
Start: 2020-11-13 | End: 2020-11-13 | Stop reason: HOSPADM

## 2020-11-13 RX ORDER — DIPHENHYDRAMINE HCL 25 MG
25 CAPSULE ORAL
Status: DISCONTINUED | OUTPATIENT
Start: 2020-11-13 | End: 2020-11-13 | Stop reason: HOSPADM

## 2020-11-13 RX ORDER — IBUPROFEN 600 MG/1
600 TABLET ORAL EVERY 6 HOURS PRN
Status: DISCONTINUED | OUTPATIENT
Start: 2020-11-13 | End: 2020-11-13 | Stop reason: HOSPADM

## 2020-11-13 RX ORDER — FENTANYL CITRATE 50 UG/ML
INJECTION, SOLUTION INTRAMUSCULAR; INTRAVENOUS AS NEEDED
Status: DISCONTINUED | OUTPATIENT
Start: 2020-11-13 | End: 2020-11-13 | Stop reason: SURG

## 2020-11-13 RX ORDER — SODIUM CHLORIDE 0.9 % (FLUSH) 0.9 %
10 SYRINGE (ML) INJECTION AS NEEDED
Status: DISCONTINUED | OUTPATIENT
Start: 2020-11-13 | End: 2020-11-13 | Stop reason: HOSPADM

## 2020-11-13 RX ORDER — DIPHENHYDRAMINE HYDROCHLORIDE 50 MG/ML
12.5 INJECTION INTRAMUSCULAR; INTRAVENOUS
Status: DISCONTINUED | OUTPATIENT
Start: 2020-11-13 | End: 2020-11-13 | Stop reason: HOSPADM

## 2020-11-13 RX ORDER — NALOXONE HCL 0.4 MG/ML
0.2 VIAL (ML) INJECTION AS NEEDED
Status: DISCONTINUED | OUTPATIENT
Start: 2020-11-13 | End: 2020-11-13 | Stop reason: HOSPADM

## 2020-11-13 RX ORDER — MIDAZOLAM HYDROCHLORIDE 1 MG/ML
1 INJECTION INTRAMUSCULAR; INTRAVENOUS
Status: COMPLETED | OUTPATIENT
Start: 2020-11-13 | End: 2020-11-13

## 2020-11-13 RX ORDER — PROMETHAZINE HYDROCHLORIDE 25 MG/1
25 SUPPOSITORY RECTAL ONCE AS NEEDED
Status: DISCONTINUED | OUTPATIENT
Start: 2020-11-13 | End: 2020-11-13 | Stop reason: HOSPADM

## 2020-11-13 RX ORDER — LIDOCAINE HYDROCHLORIDE 20 MG/ML
INJECTION, SOLUTION INFILTRATION; PERINEURAL AS NEEDED
Status: DISCONTINUED | OUTPATIENT
Start: 2020-11-13 | End: 2020-11-13 | Stop reason: SURG

## 2020-11-13 RX ORDER — SODIUM CHLORIDE 0.9 % (FLUSH) 0.9 %
10 SYRINGE (ML) INJECTION EVERY 12 HOURS SCHEDULED
Status: DISCONTINUED | OUTPATIENT
Start: 2020-11-13 | End: 2020-11-13 | Stop reason: HOSPADM

## 2020-11-13 RX ORDER — FLUMAZENIL 0.1 MG/ML
0.2 INJECTION INTRAVENOUS AS NEEDED
Status: DISCONTINUED | OUTPATIENT
Start: 2020-11-13 | End: 2020-11-13 | Stop reason: HOSPADM

## 2020-11-13 RX ORDER — LABETALOL HYDROCHLORIDE 5 MG/ML
5 INJECTION, SOLUTION INTRAVENOUS
Status: DISCONTINUED | OUTPATIENT
Start: 2020-11-13 | End: 2020-11-13 | Stop reason: HOSPADM

## 2020-11-13 RX ORDER — DEXAMETHASONE SODIUM PHOSPHATE 10 MG/ML
INJECTION INTRAMUSCULAR; INTRAVENOUS AS NEEDED
Status: DISCONTINUED | OUTPATIENT
Start: 2020-11-13 | End: 2020-11-13 | Stop reason: SURG

## 2020-11-13 RX ORDER — SODIUM CHLORIDE 0.9 % (FLUSH) 0.9 %
3 SYRINGE (ML) INJECTION EVERY 12 HOURS SCHEDULED
Status: DISCONTINUED | OUTPATIENT
Start: 2020-11-13 | End: 2020-11-13 | Stop reason: HOSPADM

## 2020-11-13 RX ORDER — IBUPROFEN 600 MG/1
600 TABLET ORAL EVERY 6 HOURS PRN
Qty: 20 TABLET | Refills: 0 | Status: SHIPPED | OUTPATIENT
Start: 2020-11-13 | End: 2020-11-24

## 2020-11-13 RX ORDER — ONDANSETRON 2 MG/ML
4 INJECTION INTRAMUSCULAR; INTRAVENOUS ONCE AS NEEDED
Status: DISCONTINUED | OUTPATIENT
Start: 2020-11-13 | End: 2020-11-13 | Stop reason: HOSPADM

## 2020-11-13 RX ORDER — PROPOFOL 10 MG/ML
VIAL (ML) INTRAVENOUS AS NEEDED
Status: DISCONTINUED | OUTPATIENT
Start: 2020-11-13 | End: 2020-11-13 | Stop reason: SURG

## 2020-11-13 RX ORDER — ONDANSETRON 2 MG/ML
INJECTION INTRAMUSCULAR; INTRAVENOUS AS NEEDED
Status: DISCONTINUED | OUTPATIENT
Start: 2020-11-13 | End: 2020-11-13 | Stop reason: SURG

## 2020-11-13 RX ORDER — KETOROLAC TROMETHAMINE 30 MG/ML
INJECTION, SOLUTION INTRAMUSCULAR; INTRAVENOUS AS NEEDED
Status: DISCONTINUED | OUTPATIENT
Start: 2020-11-13 | End: 2020-11-13 | Stop reason: SURG

## 2020-11-13 RX ORDER — HYDRALAZINE HYDROCHLORIDE 20 MG/ML
5 INJECTION INTRAMUSCULAR; INTRAVENOUS
Status: DISCONTINUED | OUTPATIENT
Start: 2020-11-13 | End: 2020-11-13 | Stop reason: HOSPADM

## 2020-11-13 RX ADMIN — LIDOCAINE HYDROCHLORIDE 60 MG: 20 INJECTION, SOLUTION INFILTRATION; PERINEURAL at 13:43

## 2020-11-13 RX ADMIN — FENTANYL CITRATE 50 MCG: 50 INJECTION INTRAMUSCULAR; INTRAVENOUS at 13:43

## 2020-11-13 RX ADMIN — KETOROLAC TROMETHAMINE 30 MG: 30 INJECTION, SOLUTION INTRAMUSCULAR; INTRAVENOUS at 14:11

## 2020-11-13 RX ADMIN — HYDROCODONE BITARTRATE AND ACETAMINOPHEN 1 TABLET: 7.5; 325 TABLET ORAL at 14:52

## 2020-11-13 RX ADMIN — FENTANYL CITRATE 25 MCG: 50 INJECTION INTRAMUSCULAR; INTRAVENOUS at 14:01

## 2020-11-13 RX ADMIN — FENTANYL CITRATE 25 MCG: 50 INJECTION INTRAMUSCULAR; INTRAVENOUS at 14:12

## 2020-11-13 RX ADMIN — DEXAMETHASONE SODIUM PHOSPHATE 8 MG: 10 INJECTION INTRAMUSCULAR; INTRAVENOUS at 13:52

## 2020-11-13 RX ADMIN — FAMOTIDINE 20 MG: 10 INJECTION INTRAVENOUS at 12:21

## 2020-11-13 RX ADMIN — MIDAZOLAM 1 MG: 1 INJECTION INTRAMUSCULAR; INTRAVENOUS at 13:06

## 2020-11-13 RX ADMIN — MIDAZOLAM 1 MG: 1 INJECTION INTRAMUSCULAR; INTRAVENOUS at 12:21

## 2020-11-13 RX ADMIN — SODIUM CHLORIDE, POTASSIUM CHLORIDE, SODIUM LACTATE AND CALCIUM CHLORIDE 9 ML/HR: 600; 310; 30; 20 INJECTION, SOLUTION INTRAVENOUS at 12:20

## 2020-11-13 RX ADMIN — PROPOFOL 200 MG: 10 INJECTION, EMULSION INTRAVENOUS at 13:43

## 2020-11-13 RX ADMIN — ONDANSETRON HYDROCHLORIDE 4 MG: 2 SOLUTION INTRAMUSCULAR; INTRAVENOUS at 13:52

## 2020-11-13 NOTE — ANESTHESIA PROCEDURE NOTES
Airway  Urgency: elective    Date/Time: 11/13/2020 1:45 PM  Airway not difficult    General Information and Staff    Patient location during procedure: OR  Anesthesiologist: Braydon Wright MD  CRNA: Migdalia Orosco CRNA    Indications and Patient Condition  Indications for airway management: airway protection    Preoxygenated: yes (Pt pre-O2 with 100% O2)  Mask difficulty assessment: 0 - not attempted    Final Airway Details  Final airway type: supraglottic airway      Successful airway: classic  Size 4    Number of attempts at approach: 1  Assessment: lips, teeth, and gum same as pre-op and atraumatic intubation    Additional Comments  ATOLMA x1. No change in dentition. + ETCO2. Airway seal pressure <20cm H2O.

## 2020-11-13 NOTE — ANESTHESIA PREPROCEDURE EVALUATION
Anesthesia Evaluation     Patient summary reviewed                Airway   Mallampati: II  Neck ROM: full  No difficulty expected  Dental      Pulmonary    Cardiovascular     Rhythm: regular        Neuro/Psych  (+) seizures (remote),     GI/Hepatic/Renal/Endo      Musculoskeletal     Abdominal    Substance History      OB/GYN          Other                        Anesthesia Plan    ASA 2     general       Anesthetic plan, all risks, benefits, and alternatives have been provided, discussed and informed consent has been obtained with: patient.  Use of blood products discussed with patient .

## 2020-11-13 NOTE — H&P
New GYN Problem    Cele Alvarez is a 40 y.o. female  who has regular menses that are heavy. The first few days she may soak through heavy pad or have flooding. Often afraid of soiling clothing.  She has not done well with oral contraceptives, she gained a lot of weight on Depo.  She had issue with an IUD as well.  She has considered her options already and is most interested in an ablation.  She uses her 's vasectomy for contraception, extremely unlikely that she would have another partner in future.    Histories  Past Medical History:   Diagnosis Date   • Acute upper respiratory infection     HISTORY   • Allergic rhinitis    • Breast lump in female     BENIGN   • Breast pain    • Encounter for preventive health examination    • Family history of diabetes mellitus     Father   • Family history of hypothyroidism     Mother   • Fatigue    • Former smoker     quit    • Heavy menstrual bleeding    • Iron deficiency anemia due to chronic blood loss    • Low hemoglobin and low hematocrit    • Overweight    • Personal history of arthritis     Neck and shoulders   • Screening for diabetes mellitus    • Seizure (CMS/HCC)     per pt no episode in an long time, not grand mal, no medications.   • Sore throat     WITH SEASONAL ALLERGIES   • Tendonitis of wrist, right    • Thyroid disorder screening    • Urinary tract infection, acute     HISTORY       Past Surgical History:   Procedure Laterality Date   • KNEE ARTHROSCOPY Right        Family History   Problem Relation Age of Onset   • Hypothyroidism Mother    • Diabetes Father    • Hypertension Father    • Pancreatitis Paternal Grandfather    • No Known Problems Other    • Breast cancer Maternal Grandmother    • Ovarian cancer Neg Hx    • Uterine cancer Neg Hx    • Colon cancer Neg Hx    • Malig Hyperthermia Neg Hx        Social History     Socioeconomic History   • Marital status:      Spouse name: Not on file   • Number of children: Not on file   • Years  of education: Not on file   • Highest education level: Not on file   Tobacco Use   • Smoking status: Former Smoker     Years: 10.00     Types: Cigarettes     Quit date:      Years since quittin.8   • Smokeless tobacco: Never Used   • Tobacco comment: quit    Substance and Sexual Activity   • Alcohol use: Yes     Frequency: Never     Comment: ONCE PER MONTH   • Drug use: Never   • Sexual activity: Yes     Partners: Male     Birth control/protection: Surgical     Comment: vasectomy       OB History        1    Para   1    Term   1            AB        Living   1       SAB        TAB        Ectopic        Molar        Multiple        Live Births   1                Physical Exam    LMP 11/10/2020 (Exact Date)     General:   alert, appears stated age and cooperative    Neck: No thyromegaly or LAD, no carotid bruit noted   Heart:: regular rate and rhythm, S1, S2 normal, no murmur, click, rub or gallop   Lungs: normal respiratory effort and auscultation   Abdomen: soft, non-tender, without masses or organomegaly   Breast: inspection negative, no nipple discharge or bleeding, no masses or nodularity palpable   Urethra and bladder: urethral meatus normal; bladder nontender to palpation;   Vulva: normal, Bartholin's, Urethra, [place]'s normal   Vagina: normal mucosa, normal discharge   Cervix: anteverted and multiparous appearance   Uterus: normal size or mid plane    Adnexa: normal adnexa and no mass, fullness, tenderness         Assessment/Plan    Abnormal uterine bleeding    We reviewed all options for managing heavy menstrual bleeding and she desires endometrial ablation.  She does need further work-up with transvaginal ultrasound and plan to return for this and also endometrial biopsy is required prior to endometrial ablation.     We reviewed all of the risks of surgery including anesthesia risks, uterine perforation, bleeding, thermal injury to surrounding structures such as bowel, bladder,  vagina.  Written information was given today about hysteroscopy, D&C with NovaSure.     Endometrial biopsy was benign, contraception with husbands vasectomy.    Rissa Burton MD  10/20/2020  09:46 EST

## 2020-11-13 NOTE — OP NOTE
GYN Operative Note  Subjective     Date of Service:  11/13/20  Time of Service:  14:24 EST    Surgical Staff: Surgeon(s) and Role:     * Rissa Burton MD - Primary   Pre-operative diagnosis(es): Pre-Op Diagnosis Codes:     * Abnormal uterine bleeding [N93.9]     Post-operative diagnosis(es): Post-Op Diagnosis Codes:     * Abnormal uterine bleeding [N93.9]   Procedure(s): Procedure(s):  DILATATION AND CURETTAGE HYSTEROSCOPY WITH NOVASURE ENDOMETRIAL ABLATION AND MYOSURE       Anesthesia: Type: General  ASA:  II     Objective      Operative findings: The endometrial cavity was normal in shape and the endometrium appeared normal as well.    Operative Note   The patient was taken to the operating room and placed in supine position.  General anesthesia was administered and the patient was prepped and draped in the usual sterile fashion with her legs placed in candy cane stirrups.  Padding was provided so that her legs did not make contact.    Weighted speculum was placed in the vagina and the cervix was grasped anteriorly with a single-tooth tenaculum.  Sequential dilators were used to dilate the cervix to 16 [place].  The uterus sounds to 7 cm.  The MyoSure scope was inserted and the cavity was distended with normal saline.  Findings were consistent with a fairly large cavity without any cavitary lesions or impingements on the lining from submucosal fibroids.  The tubal ostia are identified bilaterally.    NovaSure device is inserted and set at a length of 5 cm and deployed at a width of 3.7 cm. The cycle lasted 49 second with a power of 102 Lara. The hysteroscope was reintroduced and there was global scarring of the endometrial cavity. Very scant bleeding is noted thereafter.  The patient's awakened and taken to the recovery room in stable condition.     Specimens removed: None   Fluid Intake: 400 mL   Output: Documented Output  Est. Blood Loss 5 mL  Urine Output 100 mL    I/O this shift:  In: 400 [I.V.:400]  Out:  105 [Urine:100; Blood:5]     Complications: none   Condition: stable   Disposition: to PACU and then discharge home when recovered           Rissa Burton MD  11/13/20  14:24 EST

## 2020-11-13 NOTE — ANESTHESIA POSTPROCEDURE EVALUATION
Patient: Cele Alvarez    Procedure Summary     Date: 11/13/20 Room / Location: [facility] OR [address] [place] [address] MAIN OR    Anesthesia Start: 1335 Anesthesia Stop: 1423    Procedure: DILATATION AND CURETTAGE HYSTEROSCOPY WITH NOVASURE ENDOMETRIAL ABLATION AND MYOSURE (N/A Vagina) Diagnosis:       Abnormal uterine bleeding      (Abnormal uterine bleeding [N93.9])    Surgeon: Rissa Burton MD Provider: Braydon Wright MD    Anesthesia Type: general ASA Status: 2          Anesthesia Type: general    Vitals  Vitals Value Taken Time   /86 11/13/20 1455   Temp 36.6 °C (97.8 °F) 11/13/20 1455   Pulse 58 11/13/20 1459   Resp 14 11/13/20 1455   SpO2 100 % 11/13/20 1459   Vitals shown include unvalidated device data.        Post Anesthesia Care and Evaluation    Patient location during evaluation: PHASE II  Patient participation: complete - patient participated  Level of consciousness: awake  Pain score: 1  Pain management: adequate  Airway patency: patent  Anesthetic complications: No anesthetic complications  PONV Status: none  Cardiovascular status: acceptable  Respiratory status: acceptable  Hydration status: acceptable

## 2020-11-13 NOTE — DISCHARGE INSTRUCTIONS
You were given a pain pill at 02:52 - [employer]      Dilatation and Curettage, Care After  Refer to this sheet for the next few weeks. These instructions provide you with information on caring for yourself after your procedure. Your health care provider may also give you more specific instructions.  Your treatment has been planned according to current medical practices, but problems sometimes occur.  Call your health care provider if you have any problems or questions after your care.  ? HOME CARE INSTRUCTIONS  1. It is normal to have vaginal bleeding/abdominal cramping for the next 2 weeks.  2. Wait 1 week before returning to strenuous activity.  3. Drink enough fluids to keep your urine clear or pale yellow.  4. You may shower tomorrow.  5. Do not take a bath, go swimming or use a hot tub until your health care provider approves.  6. Only take over-the-counter or prescription medicines as directed by your health care provider.  7. Follow up with your provider as directed.    ? YOU WILL BE ON PELVIC REST FOR THE NEXT 2 WEEKS OR UNTIL SPECIFIED BY YOUR PHYSICIAN. PELVIC REST INCLUDES:  1. Avoiding long periods of standing.  2. Avoiding heavy lifting, pushing or pulling.  DO NOT lift anything heavier than 10 pounds (4.5 kg)  3. DO NOT douche, use tampons, or have sex (intercourse) for 2 weeks after the procedure.    ? SEEK MEDICAL CARE IF:    1. You have increasing cramps or pain that is not relieved with medicine.  2. You have bad smelling vaginal discharge.  3. You are having problems with any medicine.  4. You have bleeding that is heavier than a normal menstrual period (greater than 1 pad/hour) or you notice large clots.  5. You have a fever > 101.  6. You have chest pain or shortness of breath.                    Problem: CARDIOVASCULAR - ADULT  Goal: Maintains optimal cardiac output and hemodynamic stability  Description  INTERVENTIONS:  - Monitor I/O, vital signs and rhythm  - Monitor for S/S and trends of decreased cardiac output  - Administer and titrate ordered vasoactive medications to optimize hemodynamic stability  - Assess quality of pulses, skin color and temperature  - Assess for signs of decreased coronary artery perfusion  - Instruct patient to report change in severity of symptoms  Outcome: Progressing  Goal: Absence of cardiac dysrhythmias or at baseline rhythm  Description  INTERVENTIONS:  - Continuous cardiac monitoring, vital signs, obtain 12 lead EKG if ordered  - Administer antiarrhythmic and heart rate control medications as ordered  - Monitor electrolytes and administer replacement therapy as ordered  Outcome: Progressing

## 2020-11-24 ENCOUNTER — TELEMEDICINE (OUTPATIENT)
Dept: FAMILY MEDICINE CLINIC | Facility: TELEHEALTH | Age: 40
End: 2020-11-24

## 2020-11-24 ENCOUNTER — OFFICE VISIT (OUTPATIENT)
Dept: OBSTETRICS AND GYNECOLOGY | Age: 40
End: 2020-11-24

## 2020-11-24 VITALS
WEIGHT: 188 LBS | BODY MASS INDEX: 29.51 KG/M2 | SYSTOLIC BLOOD PRESSURE: 110 MMHG | HEIGHT: 67 IN | DIASTOLIC BLOOD PRESSURE: 70 MMHG

## 2020-11-24 DIAGNOSIS — Z09 POSTOPERATIVE FOLLOW-UP: Primary | ICD-10-CM

## 2020-11-24 DIAGNOSIS — J01.40 ACUTE PANSINUSITIS, RECURRENCE NOT SPECIFIED: Primary | ICD-10-CM

## 2020-11-24 PROCEDURE — 99024 POSTOP FOLLOW-UP VISIT: CPT | Performed by: OBSTETRICS & GYNECOLOGY

## 2020-11-24 PROCEDURE — 99213 OFFICE O/P EST LOW 20 MIN: CPT | Performed by: NURSE PRACTITIONER

## 2020-11-24 RX ORDER — FLUCONAZOLE 150 MG/1
TABLET ORAL
Qty: 2 TABLET | Refills: 0 | Status: SHIPPED | OUTPATIENT
Start: 2020-11-24 | End: 2021-11-02

## 2020-11-24 RX ORDER — AMOXICILLIN AND CLAVULANATE POTASSIUM 875; 125 MG/1; MG/1
1 TABLET, FILM COATED ORAL 2 TIMES DAILY
Qty: 20 TABLET | Refills: 0 | Status: SHIPPED | OUTPATIENT
Start: 2020-11-24 | End: 2020-12-04

## 2020-11-24 NOTE — PATIENT INSTRUCTIONS
Sinusitis, Adult  Sinusitis is inflammation of your sinuses. Sinuses are hollow spaces in the bones around your face. Your sinuses are located:  · Around your eyes.  · In the middle of your forehead.  · Behind your nose.  · In your cheekbones.  Mucus normally drains out of your sinuses. When your nasal tissues become inflamed or swollen, mucus can become trapped or blocked. This allows bacteria, viruses, and fungi to grow, which leads to infection. Most infections of the sinuses are caused by a virus.  Sinusitis can develop quickly. It can last for up to 4 weeks (acute) or for more than 12 weeks (chronic). Sinusitis often develops after a cold.  What are the causes?  This condition is caused by anything that creates swelling in the sinuses or stops mucus from draining. This includes:  · Allergies.  · Asthma.  · Infection from bacteria or viruses.  · Deformities or blockages in your nose or sinuses.  · Abnormal growths in the nose (nasal polyps).  · Pollutants, such as chemicals or irritants in the air.  · Infection from fungi (rare).  What increases the risk?  You are more likely to develop this condition if you:  · Have a weak body defense system (immune system).  · Do a lot of swimming or diving.  · Overuse nasal sprays.  · Smoke.  What are the signs or symptoms?  The main symptoms of this condition are pain and a feeling of pressure around the affected sinuses. Other symptoms include:  · Stuffy nose or congestion.  · Thick drainage from your nose.  · Swelling and warmth over the affected sinuses.  · Headache.  · Upper toothache.  · A cough that may get worse at night.  · Extra mucus that collects in the throat or the back of the nose (postnasal drip).  · Decreased sense of smell and taste.  · Fatigue.  · A fever.  · Sore throat.  · Bad breath.  How is this diagnosed?  This condition is diagnosed based on:  · Your symptoms.  · Your medical history.  · A physical exam.  · Tests to find out if your condition is  acute or chronic. This may include:  ? Checking your nose for nasal polyps.  ? Viewing your sinuses using a device that has a light (endoscope).  ? Testing for allergies or bacteria.  ? Imaging tests, such as an MRI or CT scan.  In rare cases, a bone biopsy may be done to rule out more serious types of fungal sinus disease.  How is this treated?  Treatment for sinusitis depends on the cause and whether your condition is chronic or acute.  · If caused by a virus, your symptoms should go away on their own within 10 days. You may be given medicines to relieve symptoms. They include:  ? Medicines that shrink swollen nasal passages (topical intranasal decongestants).  ? Medicines that treat allergies (antihistamines).  ? A spray that eases inflammation of the nostrils (topical intranasal corticosteroids).  ? Rinses that help get rid of thick mucus in your nose (nasal saline washes).  · If caused by bacteria, your health care provider may recommend waiting to see if your symptoms improve. Most bacterial infections will get better without antibiotic medicine. You may be given antibiotics if you have:  ? A severe infection.  ? A weak immune system.  · If caused by narrow nasal passages or nasal polyps, you may need to have surgery.  Follow these instructions at home:  Medicines  · Take, use, or apply over-the-counter and prescription medicines only as told by your health care provider. These may include nasal sprays.  · If you were prescribed an antibiotic medicine, take it as told by your health care provider. Do not stop taking the antibiotic even if you start to feel better.  Hydrate and humidify    · Drink enough fluid to keep your urine pale yellow. Staying hydrated will help to thin your mucus.  · Use a cool mist humidifier to keep the humidity level in your home above 50%.  · Inhale steam for 10-15 minutes, 3-4 times a day, or as told by your health care provider. You can do this in the bathroom while a hot shower is  running.  · Limit your exposure to cool or dry air.  Rest  · Rest as much as possible.  · Sleep with your head raised (elevated).  · Make sure you get enough sleep each night.  General instructions    · Apply a warm, moist washcloth to your face 3-4 times a day or as told by your health care provider. This will help with discomfort.  · Wash your hands often with soap and water to reduce your exposure to germs. If soap and water are not available, use hand .  · Do not smoke. Avoid being around people who are smoking (secondhand smoke).  · Keep all follow-up visits as told by your health care provider. This is important.  Contact a health care provider if:  · You have a fever.  · Your symptoms get worse.  · Your symptoms do not improve within 10 days.  Get help right away if:  · You have a severe headache.  · You have persistent vomiting.  · You have severe pain or swelling around your face or eyes.  · You have vision problems.  · You develop confusion.  · Your neck is stiff.  · You have trouble breathing.  Summary  · Sinusitis is soreness and inflammation of your sinuses. Sinuses are hollow spaces in the bones around your face.  · This condition is caused by nasal tissues that become inflamed or swollen. The swelling traps or blocks the flow of mucus. This allows bacteria, viruses, and fungi to grow, which leads to infection.  · If you were prescribed an antibiotic medicine, take it as told by your health care provider. Do not stop taking the antibiotic even if you start to feel better.  · Keep all follow-up visits as told by your health care provider. This is important.  This information is not intended to replace advice given to you by your health care provider. Make sure you discuss any questions you have with your health care provider.  Document Revised: 05/20/2019 Document Reviewed: 05/20/2019  [employer] Patient Education © 2020 [employer]

## 2020-11-24 NOTE — PROGRESS NOTES
CHIEF COMPLAINT  Chief Complaint   Patient presents with   • Sinusitis         HPI  Cele Alvarez is a 40 y.o. female  presents with complaint of a 3 week history of scratchy throat initially, severe pressure between eyes, dental pain, ears are itchy, headache, PND, nasal congestion, dizziness when bending over, occasional mild cough.  She denies fever, body aches, shortness of breath, persistent cough.  She is currently taking Mucinex and using Flonase nasal spray    Review of Systems   Constitutional: Negative for activity change, appetite change, chills, fatigue and fever.   HENT: Positive for congestion, postnasal drip, sinus pressure and sinus pain. Negative for ear pain.    Respiratory: Positive for cough. Negative for chest tightness, shortness of breath and wheezing.    Neurological: Positive for dizziness and headaches.   All other systems reviewed and are negative.      Past Medical History:   Diagnosis Date   • Acute upper respiratory infection     HISTORY   • Allergic rhinitis    • Breast lump in female     BENIGN   • Breast pain    • Encounter for preventive health examination    • Family history of diabetes mellitus     Father   • Family history of hypothyroidism     Mother   • Fatigue    • Former smoker     quit 2012   • Heavy menstrual bleeding    • Iron deficiency anemia due to chronic blood loss    • Low hemoglobin and low hematocrit    • Overweight    • Personal history of arthritis     Neck and shoulders   • Screening for diabetes mellitus    • Seizure (CMS/HCC)     per pt no episode in an long time, not grand mal, no medications.   • Sore throat     WITH SEASONAL ALLERGIES   • Tendonitis of wrist, right    • Thyroid disorder screening    • Urinary tract infection, acute     HISTORY       Family History   Problem Relation Age of Onset   • Hypothyroidism Mother    • Diabetes Father    • Hypertension Father    • Pancreatitis Paternal Grandfather    • No Known Problems Other    • Breast cancer  Maternal Grandmother    • Ovarian cancer Neg Hx    • Uterine cancer Neg Hx    • Colon cancer Neg Hx    • Malig Hyperthermia Neg Hx        Social History     Socioeconomic History   • Marital status:      Spouse name: Not on file   • Number of children: Not on file   • Years of education: Not on file   • Highest education level: Not on file   Tobacco Use   • Smoking status: Former Smoker     Years: 10.00     Types: Cigarettes     Quit date:      Years since quittin.9   • Smokeless tobacco: Never Used   • Tobacco comment: quit    Substance and Sexual Activity   • Alcohol use: Yes     Frequency: Never     Comment: ONCE PER MONTH   • Drug use: Never   • Sexual activity: Yes     Partners: Male     Birth control/protection: Surgical     Comment: vasectomy         LMP 11/10/2020 (Exact Date)     PHYSICAL EXAM  Physical Exam   Constitutional: She is oriented to person, place, and time. She appears well-developed and well-nourished. She does not have a sickly appearance. She does not appear ill.   HENT:   Head: Normocephalic and atraumatic.   Directed exam--bilateral maxillary and ethmoidal sinus tenderness   Pulmonary/Chest: Effort normal.  No respiratory distress.  Lymphadenopathy:   Directed exam--bilateral anterior cervical node tenderness--mild   Neurological: She is alert and oriented to person, place, and time.         Diagnoses and all orders for this visit:    1. Acute pansinusitis, recurrence not specified (Primary)  -     amoxicillin-clavulanate (AUGMENTIN) 875-125 MG per tablet; Take 1 tablet by mouth 2 (Two) Times a Day for 10 days.  Dispense: 20 tablet; Refill: 0  -     fluconazole (DIFLUCAN) 150 MG tablet; Take 1 tablet every 3 days for 2 doses  Dispense: 2 tablet; Refill: 0  --complete Augmentin as prescribed for sinus infection  --may take Fluconazole with antibiotic to prevent vaginal yeast infection  --increase intake of clear, decaffeinated fluids to help thin secretions and maintain  hydration  --may continue Mucinex and Flonase until symptoms resolve  --tylenol or ibuprofen for fever and/or pain        **if at any time experiences fever AND/OR worsening cough AND/OR shortness of breath AND/OR loss of sense of taste or smell, has been advised to go to nearest urgent care or emergency department for evaluation AND/OR testing    **if no improvement in 5-7 days, but not worsening, may schedule a f/u virtual visit or E-visit      FOLLOW-UP  As discussed during visit with PCP/Virtual Care if no improvement or Urgent Care/Emergency Department if worsening of symptoms    Patient verbalizes understanding of medication dosage, comfort measures, instructions for treatment and follow-up.    Priscilla Nguyen, APRN  11/24/2020  08:24 EST    This visit was performed via Telehealth.  This patient has been instructed to follow-up with their primary care provider if their symptoms worsen or the treatment provided does not resolve their illness.

## 2020-11-24 NOTE — PROGRESS NOTES
Subjective     Chief Complaint   Patient presents with   • Follow-up     post- op ablation        Cele Alvarez is a 40 y.o.  whose LMP is Patient's last menstrual period was 11/10/2020 (exact date). presents to follow up after ablation two weeks ago. She is doing well, no complaints besides some increased discharge  Some spotting and bloating first few days  No sig pain  Normal bladder and bowel function  No pain meds    Appears well and in no distress  No pathology from surgery    [place] from [facility]    Doing well, no current issues. To call or come on sooner if problematic bleeding or pain  Otherwise follow-up for annual next year.    Rissa Burton MD  2020

## 2021-04-16 ENCOUNTER — BULK ORDERING (OUTPATIENT)
Dept: CASE MANAGEMENT | Facility: OTHER | Age: 41
End: 2021-04-16

## 2021-04-16 DIAGNOSIS — Z23 IMMUNIZATION DUE: ICD-10-CM

## 2021-11-02 ENCOUNTER — OFFICE VISIT (OUTPATIENT)
Dept: OBSTETRICS AND GYNECOLOGY | Age: 41
End: 2021-11-02

## 2021-11-02 VITALS
SYSTOLIC BLOOD PRESSURE: 118 MMHG | HEIGHT: 67 IN | DIASTOLIC BLOOD PRESSURE: 74 MMHG | BODY MASS INDEX: 29.41 KG/M2 | WEIGHT: 187.4 LBS

## 2021-11-02 DIAGNOSIS — Z01.419 ENCOUNTER FOR GYNECOLOGICAL EXAMINATION WITHOUT ABNORMAL FINDING: ICD-10-CM

## 2021-11-02 DIAGNOSIS — Z12.31 SCREENING MAMMOGRAM FOR BREAST CANCER: Primary | ICD-10-CM

## 2021-11-02 PROCEDURE — 99396 PREV VISIT EST AGE 40-64: CPT | Performed by: OBSTETRICS & GYNECOLOGY

## 2021-11-02 NOTE — PROGRESS NOTES
Routine Annual Visit    2021    Patient: Cele Alvarez          MR#:[id number]      Chief Complaint   Patient presents with   • Gynecologic Exam     AE Today, Last AE 10/20/2020 (-), HPV (-). Pt has no complaints.        History of Present Illness    41 y.o. female  who presents for annual exam. She is very happy with results from her ablation. She has not had any menstrual bleeding this year.  No issues with SA or bowel issues  Trying to exercise some    Health Maintenance  Last pap: , history abnormal: none  Mammogram: not yet  Family history of Breast, ovarian, uterine, colon, pancreatic cancer: yes - GM with breast cancer  Contra:  vasectoomy    No LMP recorded. Patient has had an ablation.  Obstetric History:  OB History        1    Para   1    Term   1            AB        Living   1       SAB        IAB        Ectopic        Molar        Multiple        Live Births   1               Menstrual History:     No LMP recorded. Patient has had an ablation.       ________________________________________  Patient Active Problem List   Diagnosis   • Abnormal uterine bleeding   • Abnormal uterine bleeding (AUB)   • S/P endometrial ablation       Past Medical History:   Diagnosis Date   • Acute upper respiratory infection     HISTORY   • Allergic rhinitis    • Breast lump in female     BENIGN   • Breast pain    • Encounter for preventive health examination    • Family history of diabetes mellitus     Father   • Family history of hypothyroidism     Mother   • Fatigue    • Former smoker     quit    • Heavy menstrual bleeding    • Iron deficiency anemia due to chronic blood loss    • Low hemoglobin and low hematocrit    • Overweight    • Personal history of arthritis     Neck and shoulders   • Screening for diabetes mellitus    • Seizure (HCC)     per pt no episode in an long time, not grand mal, no medications.   • Sore throat     WITH SEASONAL ALLERGIES   • Tendonitis of wrist,  "right    • Thyroid disorder screening    • Urinary tract infection, acute     HISTORY       Family History   Problem Relation Age of Onset   • Hypothyroidism Mother    • Diabetes Father    • Hypertension Father    • Pancreatitis Paternal Grandfather    • No Known Problems Other    • Breast cancer Maternal Grandmother    • Ovarian cancer Neg Hx    • Uterine cancer Neg Hx    • Colon cancer Neg Hx    • Malig Hyperthermia Neg Hx        Past Surgical History:   Procedure Laterality Date   • D & C HYSTEROSCOPY ENDOMETRIAL ABLATION N/A 2020    Procedure: DILATATION AND CURETTAGE HYSTEROSCOPY WITH NOVASURE ENDOMETRIAL ABLATION AND MYOSURE;  Surgeon: Rissa Burton MD;  Location: [facility] OR;  Service: Obstetrics/Gynecology;  Laterality: N/A;   • KNEE ARTHROSCOPY Right        Social History     Tobacco Use   Smoking Status Former Smoker   • Years: 10.00   • Types: Cigarettes   • Quit date:    • Years since quittin.8   Smokeless Tobacco Never Used   Tobacco Comment    quit        currently has no medications in their medication list.  ________________________________________      Review of Systems   Constitutional: Negative for fever and unexpected weight change.   Respiratory: Negative for shortness of breath.    Cardiovascular: Negative for chest pain.   Gastrointestinal: Negative for abdominal pain, constipation and diarrhea.   Genitourinary: Negative for frequency and urgency.   Hematological: Negative for adenopathy.   Psychiatric/Behavioral: Negative for dysphoric mood.       Objective   Physical Exam    /74   Ht 170.2 cm (67\")   Wt 85 kg (187 lb 6.4 oz)   Breastfeeding No   BMI 29.35 kg/m²    BP Readings from Last 3 Encounters:   21 118/74   20 110/70   20 144/100      Wt Readings from Last 3 Encounters:   21 85 kg (187 lb 6.4 oz)   20 85.3 kg (188 lb)   20 84.3 kg (185 lb 13.6 oz)         BMI: Body mass index is 29.35 kg/m².       General:   alert, " appears stated age and cooperative   Neck: No thyromegaly or LAD, no carotid bruit noted   Heart:: regular rate and rhythm, S1, S2 normal, no murmur, click, rub or gallop   Lungs: normal respiratory effort and auscultation   Abdomen: soft, non-tender, without masses or organomegaly   Breast: inspection negative, no nipple discharge or bleeding, no masses or nodularity palpable   Urethra and bladder: urethral meatus normal; bladder nontender to palpation;   Vulva: normal, Bartholin's, Urethra, Fromberg's normal   Vagina: normal mucosa, normal discharge   Cervix: multiparous appearance and no lesions   Uterus: normal size and anteverted   Adnexa: normal adnexa and no mass, fullness, tenderness       Assessment:    normal annual exam   S/p ablation    Plan:    Plan     [x]  Mammogram request made  []  PAP done UTD  []  Labs:   []  GC/Chl/TV  []  DEXA scan   []  Referral for colonoscopy:     Happy with ablation results  Discussed     Counseling  [x]  Nutrition  [x]  Physical activity/regular exercise   [x]  Healthy weight  []  Injury prevention  []  Smoking cessation  []  Substance misuse/abuse  [x]  Sexual behavior  []  STD prevention  []  Contraception  []  Dental health  []  Mental health  []  Immunization  [x]  Encouraged SBE      Rissa Burton MD  11/02/2021  08:46 EDT

## 2021-11-11 ENCOUNTER — OFFICE VISIT (OUTPATIENT)
Dept: FAMILY MEDICINE CLINIC | Facility: CLINIC | Age: 41
End: 2021-11-11

## 2021-11-11 VITALS
HEART RATE: 62 BPM | WEIGHT: 185.8 LBS | BODY MASS INDEX: 29.16 KG/M2 | DIASTOLIC BLOOD PRESSURE: 68 MMHG | SYSTOLIC BLOOD PRESSURE: 112 MMHG | OXYGEN SATURATION: 100 % | TEMPERATURE: 97.8 F | HEIGHT: 67 IN

## 2021-11-11 DIAGNOSIS — Z00.00 ENCOUNTER FOR WELLNESS EXAMINATION IN ADULT: Primary | ICD-10-CM

## 2021-11-11 DIAGNOSIS — Z83.49 FAMILY HISTORY OF THYROID DISEASE: ICD-10-CM

## 2021-11-11 DIAGNOSIS — K76.0 FATTY LIVER: ICD-10-CM

## 2021-11-11 DIAGNOSIS — Z13.6 ENCOUNTER FOR LIPID SCREENING FOR CARDIOVASCULAR DISEASE: ICD-10-CM

## 2021-11-11 DIAGNOSIS — Z13.220 ENCOUNTER FOR LIPID SCREENING FOR CARDIOVASCULAR DISEASE: ICD-10-CM

## 2021-11-11 DIAGNOSIS — D50.0 IRON DEFICIENCY ANEMIA DUE TO CHRONIC BLOOD LOSS: ICD-10-CM

## 2021-11-11 PROCEDURE — 99396 PREV VISIT EST AGE 40-64: CPT | Performed by: FAMILY MEDICINE

## 2021-11-11 NOTE — PROGRESS NOTES
Subjective   Cele Alvarez is a 41 y.o. female who presents for annual female wellness exam.  Chief Complaint   Patient presents with   • Annual Exam     Patient's mother and sister both have hypothyroidism.       Menstrual History: Uterine ablation in .  No longer having menstrual cycles.  Pregnancy History:   Sexual History: Monogamous male partner for the past 18 years  Contraception:  vasectomy  Diet: Healthy balanced.  Exercise: 1-2 times weekly walks  Do you feel safe? Yes  Have you ever been abused? No    Mammogram: scheduled for 21  Pap Smear:  per gyn, normal  Bone Density: NA  Colon Cancer Screening: NA, no FH of Colon CA      There is no immunization history on file for this patient.    The following portions of the patient's history were reviewed and updated as appropriate: allergies, current medications, past family history, past medical history, past social history, past surgical history and problem list.    Past Medical History:   Diagnosis Date   • Acute upper respiratory infection     HISTORY   • Allergic rhinitis    • Breast lump in female     BENIGN   • Breast pain    • Encounter for preventive health examination    • Family history of diabetes mellitus     Father   • Family history of hypothyroidism     Mother   • Fatigue    • Former smoker     quit    • Heavy menstrual bleeding    • Iron deficiency anemia due to chronic blood loss    • Low hemoglobin and low hematocrit    • Overweight    • Personal history of arthritis     Neck and shoulders   • Screening for diabetes mellitus    • Seizure (HCC)     per pt no episode in an long time, not grand mal, no medications.   • Sore throat     WITH SEASONAL ALLERGIES   • Tendonitis of wrist, right    • Thyroid disorder screening    • Urinary tract infection, acute     HISTORY       Past Surgical History:   Procedure Laterality Date   • D & C HYSTEROSCOPY ENDOMETRIAL ABLATION N/A 2020    Procedure: DILATATION AND CURETTAGE  HYSTEROSCOPY WITH NOVASURE ENDOMETRIAL ABLATION AND MYOSURE;  Surgeon: Rissa Burton MD;  Location: [facility];  Service: Obstetrics/Gynecology;  Laterality: N/A;   • KNEE ARTHROSCOPY Right        Family History   Problem Relation Age of Onset   • Hypothyroidism Mother    • Diabetes Father    • Hypertension Father    • Pancreatitis Paternal Grandfather    • No Known Problems Other    • Breast cancer Maternal Grandmother    • Ovarian cancer Neg Hx    • Uterine cancer Neg Hx    • Colon cancer Neg Hx    • Malig Hyperthermia Neg Hx        Social History     Socioeconomic History   • Marital status:      Spouse name: Titi   • Number of children: 1   Tobacco Use   • Smoking status: Former Smoker     Packs/day: 0.00     Years: 10.00     Pack years: 0.00     Types: Cigarettes     Quit date:      Years since quittin.8   • Smokeless tobacco: Never Used   • Tobacco comment: quit    Vaping Use   • Vaping Use: Never used   Substance and Sexual Activity   • Alcohol use: Yes     Comment: ONCE PER MONTH   • Drug use: Never   • Sexual activity: Yes     Partners: Male     Birth control/protection: Surgical     Comment: vasectomy       Review of Systems   Constitutional: Negative for activity change, appetite change, fatigue and unexpected weight change.   HENT: Negative for congestion, ear pain, nosebleeds, sore throat and tinnitus.    Eyes: Negative for pain, redness and visual disturbance.   Respiratory: Negative for cough, shortness of breath and wheezing.    Cardiovascular: Negative for chest pain, palpitations and leg swelling.   Gastrointestinal: Negative for abdominal pain, blood in stool and nausea.   Endocrine: Negative for polydipsia and polyuria.   Genitourinary: Negative for dysuria, frequency, menstrual problem and vaginal discharge.   Musculoskeletal: Negative for arthralgias, joint swelling and myalgias.   Skin: Negative for rash.   Allergic/Immunologic: Negative for environmental  allergies, food allergies and immunocompromised state.   Neurological: Negative for dizziness, speech difficulty, weakness and headaches.   Hematological: Negative for adenopathy. Does not bruise/bleed easily.   Psychiatric/Behavioral: Negative for decreased concentration and dysphoric mood. The patient is not nervous/anxious.        Objective   Vitals:    11/11/21 1332   BP: 112/68   Pulse: 62   Temp: 97.8 °F (36.6 °C)   SpO2: 100%     Body mass index is 29.1 kg/m².  Physical Exam  Vitals and nursing note reviewed.   Constitutional:       Appearance: Normal appearance. She is well-developed and overweight.   HENT:      Head: Normocephalic and atraumatic.   Eyes:      General: No scleral icterus.     Conjunctiva/sclera: Conjunctivae normal.   Cardiovascular:      Rate and Rhythm: Normal rate and regular rhythm.      Heart sounds: Normal heart sounds.   Pulmonary:      Effort: Pulmonary effort is normal.      Breath sounds: Normal breath sounds.   Musculoskeletal:         General: Normal range of motion.      Cervical back: Normal range of motion and neck supple.   Skin:     General: Skin is warm and dry.      Capillary Refill: Capillary refill takes less than 2 seconds.      Findings: No rash.   Neurological:      Mental Status: She is alert and oriented to person, place, and time.   Psychiatric:         Mood and Affect: Mood normal.         Behavior: Behavior normal.         Thought Content: Thought content normal.         Judgment: Judgment normal.         Assessment/Plan   Diagnoses and all orders for this visit:    1. Encounter for wellness examination in adult (Primary)    2. Fatty liver  -     Comprehensive Metabolic Panel    3. Iron deficiency anemia due to chronic blood loss  -     CBC & Differential    4. Family history of thyroid disease  -     TSH    5. Encounter for lipid screening for cardiovascular disease  -     Lipid Panel        Discussed the importance of maintaining a healthy weight and getting  regular exercise.  Educated patient on the benefits of healthy diet.  Advise follow-up annually for wellness exams.      There are no Patient Instructions on file for this visit.

## 2021-11-12 LAB
ALBUMIN SERPL-MCNC: 4.6 G/DL (ref 3.8–4.8)
ALBUMIN/GLOB SERPL: 1.9 {RATIO} (ref 1.2–2.2)
ALP SERPL-CCNC: 29 IU/L (ref 44–121)
ALT SERPL-CCNC: 10 IU/L (ref 0–32)
AST SERPL-CCNC: 13 IU/L (ref 0–40)
BASOPHILS # BLD AUTO: 0.1 X10E3/UL (ref 0–0.2)
BASOPHILS NFR BLD AUTO: 1 %
BILIRUB SERPL-MCNC: 0.5 MG/DL (ref 0–1.2)
BUN SERPL-MCNC: 9 MG/DL (ref 6–24)
BUN/CREAT SERPL: 13 (ref 9–23)
CALCIUM SERPL-MCNC: 9.3 MG/DL (ref 8.7–10.2)
CHLORIDE SERPL-SCNC: 102 MMOL/L (ref 96–106)
CHOLEST SERPL-MCNC: 188 MG/DL (ref 100–199)
CO2 SERPL-SCNC: 21 MMOL/L (ref 20–29)
CREAT SERPL-MCNC: 0.69 MG/DL (ref 0.57–1)
EOSINOPHIL # BLD AUTO: 0.1 X10E3/UL (ref 0–0.4)
EOSINOPHIL NFR BLD AUTO: 1 %
ERYTHROCYTE [DISTWIDTH] IN BLOOD BY AUTOMATED COUNT: 12.6 % (ref 11.7–15.4)
GLOBULIN SER CALC-MCNC: 2.4 G/DL (ref 1.5–4.5)
GLUCOSE SERPL-MCNC: 75 MG/DL (ref 65–99)
HCT VFR BLD AUTO: 43.3 % (ref 34–46.6)
HDLC SERPL-MCNC: 36 MG/DL
HGB BLD-MCNC: 14 G/DL (ref 11.1–15.9)
IMM GRANULOCYTES # BLD AUTO: 0 X10E3/UL (ref 0–0.1)
IMM GRANULOCYTES NFR BLD AUTO: 0 %
LDLC SERPL CALC-MCNC: 141 MG/DL (ref 0–99)
LYMPHOCYTES # BLD AUTO: 2 X10E3/UL (ref 0.7–3.1)
LYMPHOCYTES NFR BLD AUTO: 26 %
MCH RBC QN AUTO: 27.6 PG (ref 26.6–33)
MCHC RBC AUTO-ENTMCNC: 32.3 G/DL (ref 31.5–35.7)
MCV RBC AUTO: 85 FL (ref 79–97)
MONOCYTES # BLD AUTO: 0.5 X10E3/UL (ref 0.1–0.9)
MONOCYTES NFR BLD AUTO: 6 %
NEUTROPHILS # BLD AUTO: 5 X10E3/UL (ref 1.4–7)
NEUTROPHILS NFR BLD AUTO: 66 %
PLATELET # BLD AUTO: 348 X10E3/UL (ref 150–450)
POTASSIUM SERPL-SCNC: 4.2 MMOL/L (ref 3.5–5.2)
PROT SERPL-MCNC: 7 G/DL (ref 6–8.5)
RBC # BLD AUTO: 5.08 X10E6/UL (ref 3.77–5.28)
SODIUM SERPL-SCNC: 140 MMOL/L (ref 134–144)
TRIGL SERPL-MCNC: 57 MG/DL (ref 0–149)
TSH SERPL DL<=0.005 MIU/L-ACNC: 1.16 UIU/ML (ref 0.45–4.5)
VLDLC SERPL CALC-MCNC: 11 MG/DL (ref 5–40)
WBC # BLD AUTO: 7.6 X10E3/UL (ref 3.4–10.8)

## 2021-12-08 ENCOUNTER — HOSPITAL ENCOUNTER (OUTPATIENT)
Dept: MAMMOGRAPHY | Facility: HOSPITAL | Age: 41
Discharge: HOME | End: 2021-12-08
Admitting: OBSTETRICS & GYNECOLOGY

## 2021-12-08 DIAGNOSIS — Z12.31 SCREENING MAMMOGRAM FOR BREAST CANCER: ICD-10-CM

## 2021-12-08 PROCEDURE — 77063 BREAST TOMOSYNTHESIS BI: CPT

## 2021-12-08 PROCEDURE — 77067 SCR MAMMO BI INCL CAD: CPT

## 2022-11-08 ENCOUNTER — OFFICE VISIT (OUTPATIENT)
Dept: OBSTETRICS AND GYNECOLOGY | Age: 42
End: 2022-11-08

## 2022-11-08 VITALS
SYSTOLIC BLOOD PRESSURE: 116 MMHG | BODY MASS INDEX: 29.98 KG/M2 | HEIGHT: 67 IN | DIASTOLIC BLOOD PRESSURE: 62 MMHG | WEIGHT: 191 LBS

## 2022-11-08 DIAGNOSIS — Z12.31 SCREENING MAMMOGRAM FOR BREAST CANCER: Primary | ICD-10-CM

## 2022-11-08 DIAGNOSIS — Z01.419 ENCOUNTER FOR GYNECOLOGICAL EXAMINATION WITHOUT ABNORMAL FINDING: ICD-10-CM

## 2022-11-08 PROCEDURE — 99396 PREV VISIT EST AGE 40-64: CPT | Performed by: OBSTETRICS & GYNECOLOGY

## 2022-11-08 NOTE — PROGRESS NOTES
Routine Annual Visit    2022    Patient: Cele Alvarez          MR#:[id number]      Chief Complaint   Patient presents with   • Gynecologic Exam     AE Today, Last AE 2021, Last pap 10/20/2020 (-), HPV (-), MG 2021         History of Present Illness    42 y.o. female  who presents for annual exam.   History of ablation, no further menstrual bleeding.  No hot flashes or night sweats.  She does notes a decrease in libido recently.  No issues with the relationship, no dyspareunia.  Feels like they just do not make time anymore, she is just not interested.      No LMP recorded. Patient has had an ablation.  Obstetric History:  OB History        1    Para   1    Term   1            AB        Living   1       SAB        IAB        Ectopic        Molar        Multiple        Live Births   1               Menstrual History:     No LMP recorded. Patient has had an ablation.       ________________________________________  Patient Active Problem List   Diagnosis   • Abnormal uterine bleeding   • Abnormal uterine bleeding (AUB)   • S/P endometrial ablation       Past Medical History:   Diagnosis Date   • Acute upper respiratory infection     HISTORY   • Allergic rhinitis    • Breast lump in female     BENIGN   • Breast pain    • Encounter for preventive health examination    • Family history of diabetes mellitus     Father   • Family history of hypothyroidism     Mother   • Fatigue    • Former smoker     quit    • Heavy menstrual bleeding    • Iron deficiency anemia due to chronic blood loss    • Low hemoglobin and low hematocrit    • Overweight    • Personal history of arthritis     Neck and shoulders   • Screening for diabetes mellitus    • Seizure (HCC)     per pt no episode in an long time, not grand mal, no medications.   • Sore throat     WITH SEASONAL ALLERGIES   • Tendonitis of wrist, right    • Thyroid disorder screening    • Urinary tract infection, acute     HISTORY       Family  "History   Problem Relation Age of Onset   • Hypothyroidism Mother    • Diabetes Father    • Hypertension Father    • Pancreatitis Paternal Grandfather    • No Known Problems Other    • Breast cancer Maternal Grandmother    • Ovarian cancer Neg Hx    • Uterine cancer Neg Hx    • Colon cancer Neg Hx    • Malig Hyperthermia Neg Hx        Past Surgical History:   Procedure Laterality Date   • D & C HYSTEROSCOPY ENDOMETRIAL ABLATION N/A 11/13/2020    Procedure: DILATATION AND CURETTAGE HYSTEROSCOPY WITH NOVASURE ENDOMETRIAL ABLATION AND MYOSURE;  Surgeon: Rissa Burton MD;  Location: [facility] OR;  Service: Obstetrics/Gynecology;  Laterality: N/A;   • KNEE ARTHROSCOPY Right        Social History     Tobacco Use   Smoking Status Former   • Packs/day: 0.00   • Years: 10.00   • Pack years: 0.00   • Types: Cigarettes   • Quit date: 2012   • Years since quitting: 10.8   Smokeless Tobacco Never   Tobacco Comments    quit 2012       currently has no medications in their medication list.  ________________________________________      Review of Systems   Constitutional: Negative for fever and unexpected weight change.   Respiratory: Negative for shortness of breath.    Cardiovascular: Negative for chest pain.   Gastrointestinal: Negative for abdominal pain, constipation and diarrhea.   Genitourinary: Negative for frequency, menstrual problem and urgency.   Hematological: Negative for adenopathy.   Psychiatric/Behavioral: Negative for dysphoric mood.       Objective   Physical Exam    /62   Ht 170.2 cm (67\")   Wt 86.6 kg (191 lb)   BMI 29.91 kg/m²    BP Readings from Last 3 Encounters:   11/08/22 116/62   11/11/21 112/68   11/02/21 118/74      Wt Readings from Last 3 Encounters:   11/08/22 86.6 kg (191 lb)   11/11/21 84.3 kg (185 lb 12.8 oz)   11/02/21 85 kg (187 lb 6.4 oz)         BMI: Body mass index is 29.91 kg/m².       General:   alert, appears stated age and cooperative   Neck: No thyromegaly or LAD   Heart:: " regular rate and rhythm, S1, S2 normal, no murmur, click, rub or gallop   Lungs: normal respiratory effort and auscultation   Abdomen: soft, non-tender, without masses or organomegaly   Breast: inspection negative, no nipple discharge or bleeding, no masses or nodularity palpable   Urethra and bladder: urethral meatus normal; bladder nontender to palpation;   Vulva: normal, Bartholin's, Urethra, Cloudcroft's normal   Vagina: normal mucosa, normal discharge   Cervix: multiparous appearance and no lesions   Uterus: normal size, mobile and non-tender   Adnexa: normal adnexa and no mass, fullness, tenderness       Assessment:    normal annual exam   History of endometrial ablation  Low libido    Plan:    Plan     [x]  Mammogram request made  []  PAP done  []  Labs:   []  GC/Chl/TV  []  DEXA scan   []  Referral for colonoscopy:     Discussed some lifestyle changes to help with libido issues.  She does not seem to have an organic cause at this time.  Also discussed a podcast that might be helpful with this issue.    Counseling  [x]  Nutrition  [x]  Physical activity/regular exercise   [x]  Healthy weight  []  Injury prevention  []  Smoking cessation  []  Substance misuse/abuse  [x]  Sexual behavior  []  STD prevention  []  Contraception  []  Dental health  []  Mental health  []  Immunization  [x]  Encouraged SBE        Rissa uBrton MD  11/08/2022  12:45 EST

## 2022-11-14 ENCOUNTER — OFFICE VISIT (OUTPATIENT)
Dept: FAMILY MEDICINE CLINIC | Facility: CLINIC | Age: 42
End: 2022-11-14

## 2022-11-14 VITALS
HEART RATE: 68 BPM | DIASTOLIC BLOOD PRESSURE: 80 MMHG | SYSTOLIC BLOOD PRESSURE: 110 MMHG | HEIGHT: 67 IN | OXYGEN SATURATION: 98 % | TEMPERATURE: 98 F | WEIGHT: 193.8 LBS | BODY MASS INDEX: 30.42 KG/M2

## 2022-11-14 DIAGNOSIS — Z00.00 ENCOUNTER FOR WELLNESS EXAMINATION IN ADULT: Primary | ICD-10-CM

## 2022-11-14 PROCEDURE — 99396 PREV VISIT EST AGE 40-64: CPT | Performed by: FAMILY MEDICINE

## 2022-11-14 NOTE — PROGRESS NOTES
Subjective   Cele Alvarez is a 42 y.o. female who presents for annual female wellness exam.  Chief Complaint   Patient presents with   • Annual Exam        Menstrual History: Uterine ablation in .  No longer having menstrual cycles.  Pregnancy History:   Sexual History: Monogamous male partner for the past 19 years  Contraception:  vasectomy  Diet: Healthy balanced.  Exercise: 1-2 times weekly walks  Do you feel safe? Yes  Have you ever been abused? No    Mammogram:  Normal 21  Per GYN  Pap Smear:  per gyn, normal  Bone Density: NA  Colon Cancer Screening: NA, no FH of Colon CA      There is no immunization history on file for this patient.    The following portions of the patient's history were reviewed and updated as appropriate: allergies, current medications, past family history, past medical history, past social history, past surgical history and problem list.    Past Medical History:   Diagnosis Date   • Acute upper respiratory infection     HISTORY   • Allergic    • Allergic rhinitis    • Arthritis    • Asthma    • Breast lump in female     BENIGN   • Breast pain    • Encounter for preventive health examination    • Family history of diabetes mellitus     Father   • Family history of hypothyroidism     Mother   • Fatigue    • Former smoker     quit    • Headache    • Heavy menstrual bleeding    • Iron deficiency anemia due to chronic blood loss    • Low back pain    • Low hemoglobin and low hematocrit    • Overweight    • Personal history of arthritis     Neck and shoulders   • Screening for diabetes mellitus    • Seizure (HCC)     per pt no episode in an long time, not grand mal, no medications.   • Sore throat     WITH SEASONAL ALLERGIES   • Tendonitis of wrist, right    • Thyroid disorder screening    • Urinary tract infection, acute     HISTORY       Past Surgical History:   Procedure Laterality Date   • D & C HYSTEROSCOPY ENDOMETRIAL ABLATION N/A 2020    Procedure:  DILATATION AND CURETTAGE HYSTEROSCOPY WITH NOVASURE ENDOMETRIAL ABLATION AND MYOSURE;  Surgeon: Rissa Burton MD;  Location: [facility] OR;  Service: Obstetrics/Gynecology;  Laterality: N/A;   • ENDOMETRIAL ABLATION  11/2021   • KNEE ARTHROSCOPY Right        Family History   Problem Relation Age of Onset   • Hypothyroidism Mother    • Hypertension Mother    • Thyroid disease Mother    • Diabetes Father    • Hypertension Father    • Pancreatitis Paternal Grandfather    • Diabetes Paternal Grandfather    • No Known Problems Other    • Breast cancer Maternal Grandmother    • Diabetes Paternal Uncle    • Thyroid disease Sister    • Ovarian cancer Neg Hx    • Uterine cancer Neg Hx    • Colon cancer Neg Hx    • Malig Hyperthermia Neg Hx        Social History     Socioeconomic History   • Marital status:      Spouse name: Titi   • Number of children: 1   Tobacco Use   • Smoking status: Former     Packs/day: 0.00     Years: 10.00     Pack years: 0.00     Types: Cigarettes     Quit date: 1/1/2012     Years since quitting: 10.8   • Smokeless tobacco: Never   • Tobacco comments:     quit 2012   Vaping Use   • Vaping Use: Never used   Substance and Sexual Activity   • Alcohol use: Yes     Comment: ONCE PER MONTH   • Drug use: Never   • Sexual activity: Yes     Partners: Male     Birth control/protection: Surgical     Comment: vasectomy       Review of Systems   Constitutional: Negative for activity change, appetite change, fatigue and unexpected weight change.   HENT: Negative for congestion, ear pain, nosebleeds, sore throat and tinnitus.    Eyes: Negative for pain, redness and visual disturbance.   Respiratory: Negative for cough, shortness of breath and wheezing.    Cardiovascular: Negative for chest pain, palpitations and leg swelling.   Gastrointestinal: Negative for abdominal pain, blood in stool and nausea.   Endocrine: Negative for polydipsia and polyuria.   Genitourinary: Negative for dysuria, frequency,  menstrual problem and vaginal discharge.   Musculoskeletal: Negative for arthralgias, joint swelling and myalgias.   Skin: Negative for rash.   Allergic/Immunologic: Negative for environmental allergies, food allergies and immunocompromised state.   Neurological: Negative for dizziness, speech difficulty, weakness and headaches.   Hematological: Negative for adenopathy. Does not bruise/bleed easily.   Psychiatric/Behavioral: Negative for decreased concentration and dysphoric mood. The patient is not nervous/anxious.        Objective   Vitals:    11/14/22 1045   BP: 110/80   Pulse: 68   Temp: 98 °F (36.7 °C)   SpO2: 98%     Body mass index is 30.35 kg/m².  Physical Exam  Vitals and nursing note reviewed.   Constitutional:       Appearance: Normal appearance. She is well-developed.   HENT:      Head: Normocephalic and atraumatic.      Right Ear: External ear normal.      Left Ear: External ear normal.      Nose: Nose normal.   Eyes:      General: No scleral icterus.     Conjunctiva/sclera: Conjunctivae normal.   Cardiovascular:      Rate and Rhythm: Normal rate and regular rhythm.      Heart sounds: Normal heart sounds.   Pulmonary:      Effort: Pulmonary effort is normal.      Breath sounds: Normal breath sounds.   Musculoskeletal:      Cervical back: Normal range of motion and neck supple.      Right lower leg: No edema.      Left lower leg: No edema.   Lymphadenopathy:      Cervical: No cervical adenopathy.   Skin:     General: Skin is warm and dry.      Findings: No rash.   Neurological:      Mental Status: She is alert and oriented to person, place, and time.   Psychiatric:         Mood and Affect: Mood normal.         Behavior: Behavior normal.         Thought Content: Thought content normal.         Judgment: Judgment normal.           Assessment & Plan   Diagnoses and all orders for this visit:    1. Encounter for wellness examination in adult (Primary)          Discussed the importance of maintaining a  healthy weight and getting regular exercise.  Educated patient on the benefits of healthy diet.  Advise follow-up annually for wellness exams.      There are no Patient Instructions on file for this visit.

## 2022-12-13 ENCOUNTER — HOSPITAL ENCOUNTER (OUTPATIENT)
Dept: MAMMOGRAPHY | Facility: HOSPITAL | Age: 42
Discharge: HOME | End: 2022-12-13
Admitting: OBSTETRICS & GYNECOLOGY

## 2022-12-13 DIAGNOSIS — Z12.31 SCREENING MAMMOGRAM FOR BREAST CANCER: ICD-10-CM

## 2022-12-13 PROCEDURE — 77067 SCR MAMMO BI INCL CAD: CPT

## 2022-12-13 PROCEDURE — 77063 BREAST TOMOSYNTHESIS BI: CPT

## 2023-03-11 NOTE — DISCHARGE INSTRUCTIONS
Take the following medications the morning of surgery: NONE    ARRIVE 11:30 AM  11/13/20      If you are on prescription narcotic pain medication to control your pain you may also take that medication the morning of surgery.    General Instructions:  • Do not eat solid food after midnight the night before surgery.  • You may drink clear liquids day of surgery but must stop at least one hour before your hospital arrival time.  • It is beneficial for you to have a clear drink that contains carbohydrates the day of surgery.  We suggest a 12 to 20 ounce bottle of Gatorade or Powerade for non-diabetic patients or a 12 to 20 ounce bottle of G2 or Powerade Zero for diabetic patients. (Pediatric patients, are not advised to drink a 12 to 20 ounce carbohydrate drink)    Clear liquids are liquids you can see through.  Nothing red in color.     Plain water                               Sports drinks  Sodas                                   Gelatin (Jell-O)  Fruit juices without pulp such as white grape juice and apple juice  Popsicles that contain no fruit or yogurt  Tea or coffee (no cream or milk added)  Gatorade / Powerade  G2 / Powerade Zero    • Infants may have breast milk up to four hours before surgery.  • Infants drinking formula may drink formula up to six hours before surgery.   • Patients who avoid smoking, chewing tobacco and alcohol for 4 weeks prior to surgery have a reduced risk of post-operative complications.  Quit smoking as many days before surgery as you can.  • Do not smoke, use chewing tobacco or drink alcohol the day of surgery.   • If applicable bring your C-PAP/ BI-PAP machine.  • Bring any papers given to you in the doctor’s office.  • Wear clean comfortable clothes.  • Do not wear contact lenses, false eyelashes or make-up.  Bring a case for your glasses.   • Bring crutches or walker if applicable.  • Remove all piercings.  Leave jewelry and any other valuables at home.  • Hair extensions with metal  clips must be removed prior to surgery.  • The Pre-Admission Testing nurse will instruct you to bring medications if unable to obtain an accurate list in Pre-Admission Testing.        If you were given a blood bank ID arm band remember to bring it with you the day of surgery.    Preventing a Surgical Site Infection:  • For 2 to 3 days before surgery, avoid shaving with a razor because the razor can irritate skin and make it easier to develop an infection.    • Any areas of open skin can increase the risk of a post-operative wound infection by allowing bacteria to enter and travel throughout the body.  Notify your surgeon if you have any skin wounds / rashes even if it is not near the expected surgical site.  The area will need assessed to determine if surgery should be delayed until it is healed.  • The night prior to surgery shower using a fresh bar of anti-bacterial soap (such as Dial) and clean washcloth.  Sleep in a clean bed with clean clothing.  Do not allow pets to sleep with you.  • Shower on the morning of surgery using a fresh bar of anti-bacterial soap (such as Dial) and clean washcloth.  Dry with a clean towel and dress in clean clothing.  • Ask your surgeon if you will be receiving antibiotics prior to surgery.  • Make sure you, your family, and all healthcare providers clean their hands with soap and water or an alcohol based hand  before caring for you or your wound.    Day of surgery:  Your arrival time is approximately two hours before your scheduled surgery time.  Upon arrival, a Pre-op nurse and Anesthesiologist will review your health history, obtain vital signs, and answer questions you may have.  The only belongings needed at this time will be a list of your home medications and if applicable your C-PAP/BI-PAP machine.  If you are staying overnight your family can leave the rest of your belongings in the car and bring them to your room later.  A Pre-op nurse will start an IV and you  may receive medication in preparation for surgery, including something to help you relax.  While you are in surgery your family should notify the waiting room  if they leave the waiting room area and provide a contact phone number.    Please be aware that surgery does come with discomfort.  We want to make every effort to control your discomfort so please discuss any uncontrolled symptoms with your nurse.   Your doctor will most likely have prescribed pain medications.      If you are going home after surgery you will receive individualized written care instructions before being discharged.  A responsible adult must drive you to and from the hospital on the day of your surgery and stay with you for 24 hours.    If you are staying overnight following surgery, you will be transported to your hospital room following the recovery period.  [facility] has all private rooms.    If you have any questions please call Pre-Admission Testing at [phone].  Deductibles and co-payments are collected on the day of service. Please be prepared to pay the required co-pay, deductible or deposit on the day of service as defined by your plan.    Patient Education for Self-Quarantine Process    Following your COVID testing, we strongly recommend that you do not leave your home after you have been tested for COVID except to get medical care. This includes not going to work, school or to public areas.  If this is not possible for you to do please limit your activities to only required outings.  Be sure to wear a mask when you are with other people, practice social distancing and wash your hands frequently.      The following items provide additional details to keep you safe.  • Wash your hands with soap and water frequently for at least 20 seconds.   • Avoid touching your eyes, nose and mouth with unwashed hands.  • Do not share anything - utensils, towels, food from the same bowl.   • Have your own utensils,  drinking glass, dishes, towels and bedding.   • Do not have visitors.   • Do use FaceTime to stay in touch with family and friends.  • You should stay in a specific room away from others if possible.   • Stay at least 6 feet away from others in the home if you cannot have a dedicated room to yourself.   • Do not snuggle with your pet. While the CDC says there is no evidence that pets can spread COVID-19 or be infected from humans, it is probably best to avoid “petting, snuggling, being kissed or licked and sharing food (during self-quarantine)”, according to the CDC.   • Sanitize household surfaces daily. Include all high touch areas (door handles, light switches, phones, countertops, etc.)  • Do not share a bathroom with others, if possible.   • Wear a mask around others in your home if you are unable to stay in a separate room or 6 feet apart. If  you are unable to wear a mask, have your family member wear a mask if they must be within 6 feet of you.   Call your surgeon immediately if you experience any of the following symptoms:  • Sore Throat  • Shortness of Breath or difficulty breathing  • Cough  • Chills  • Body soreness or muscle pain  • Headache  • Fever  • New loss of taste or smell  • Do not arrive for your surgery ill.  Your procedure will need to be rescheduled to another time.  You will need to call your physician before the day of surgery to avoid any unnecessary exposure to hospital staff as well as other patients.       normal

## 2023-11-14 ENCOUNTER — OFFICE VISIT (OUTPATIENT)
Dept: OBSTETRICS AND GYNECOLOGY | Age: 43
End: 2023-11-14
Payer: COMMERCIAL

## 2023-11-14 VITALS
SYSTOLIC BLOOD PRESSURE: 114 MMHG | DIASTOLIC BLOOD PRESSURE: 78 MMHG | WEIGHT: 196 LBS | BODY MASS INDEX: 30.76 KG/M2 | HEIGHT: 67 IN

## 2023-11-14 DIAGNOSIS — Z01.419 ENCOUNTER FOR GYNECOLOGICAL EXAMINATION WITHOUT ABNORMAL FINDING: ICD-10-CM

## 2023-11-14 DIAGNOSIS — Z12.31 ENCOUNTER FOR SCREENING MAMMOGRAM FOR MALIGNANT NEOPLASM OF BREAST: ICD-10-CM

## 2023-11-14 DIAGNOSIS — Z12.4 SCREENING FOR CERVICAL CANCER: Primary | ICD-10-CM

## 2023-11-14 RX ORDER — RIMEGEPANT SULFATE 75 MG/75MG
TABLET, ORALLY DISINTEGRATING ORAL
COMMUNITY
Start: 2023-10-16

## 2023-11-14 NOTE — PROGRESS NOTES
Routine Annual Visit    2023    Patient: Cele Alvarez          MR#:[id number]      Chief Complaint   Patient presents with    Gynecologic Exam     AE Today, Last AE 2022, Last pap 10/20/2020 (-), HPV (-), MG 2022       History of Present Illness    43 y.o. female  who presents for annual exam.   No bleeding since ablation  She notes maybe some occasional hot flashes. Women in family menopausal in their 40's.       Health Maintenance  Last pap: , history abnormal: no  Mammogram:   Colonoscopy none yet  Family history of Breast, ovarian, uterine, colon, pancreatic cancer: yes - MGM      No LMP recorded. Patient has had an ablation.  Obstetric History:  OB History          1    Para   1    Term   1            AB        Living   1         SAB        IAB        Ectopic        Molar        Multiple        Live Births   1               Menstrual History:     No LMP recorded. Patient has had an ablation.       ________________________________________  Patient Active Problem List   Diagnosis    Abnormal uterine bleeding    Abnormal uterine bleeding (AUB)    S/P endometrial ablation       Past Medical History:   Diagnosis Date    Acute upper respiratory infection     HISTORY    Allergic     Allergic rhinitis     Arthritis     Asthma     Breast lump in female     BENIGN    Breast pain     Encounter for preventive health examination     Family history of diabetes mellitus     Father    Family history of hypothyroidism     Mother    Fatigue     Former smoker     quit     Headache     Heavy menstrual bleeding     Iron deficiency anemia due to chronic blood loss     Low back pain     Low hemoglobin and low hematocrit     Overweight     Personal history of arthritis     Neck and shoulders    Screening for diabetes mellitus     Seizure     per pt no episode in an long time, not grand mal, no medications.    Sore throat     WITH SEASONAL ALLERGIES    Tendonitis of wrist, right      "Thyroid disorder screening     Urinary tract infection, acute     HISTORY       Family History   Problem Relation Age of Onset    Hypothyroidism Mother     Hypertension Mother     Thyroid disease Mother     Diabetes Father     Hypertension Father     Pancreatitis Paternal Grandfather     Diabetes Paternal Grandfather     No Known Problems Other     Breast cancer Maternal Grandmother     Diabetes Paternal Uncle     Thyroid disease Sister     Ovarian cancer Neg Hx     Uterine cancer Neg Hx     Colon cancer Neg Hx     Malig Hyperthermia Neg Hx        Past Surgical History:   Procedure Laterality Date    D & C HYSTEROSCOPY ENDOMETRIAL ABLATION N/A 2020    Procedure: DILATATION AND CURETTAGE HYSTEROSCOPY WITH NOVASURE ENDOMETRIAL ABLATION AND MYOSURE;  Surgeon: Rissa Burton MD;  Location: [facility];  Service: Obstetrics/Gynecology;  Laterality: N/A;    ENDOMETRIAL ABLATION  2021    KNEE ARTHROSCOPY Right        Social History     Tobacco Use   Smoking Status Former    Packs/day: 0.00    Years: 10.00    Additional pack years: 0.00    Total pack years: 0.00    Types: Cigarettes    Quit date: 2012    Years since quittin.8   Smokeless Tobacco Never   Tobacco Comments    quit        has a current medication list which includes the following prescription(s): nurtec.  ________________________________________        Objective   Physical Exam    /78   Ht 170.2 cm (67\")   Wt 88.9 kg (196 lb)   BMI 30.70 kg/m²    BP Readings from Last 3 Encounters:   23 114/78   22 110/80   22 116/62      Wt Readings from Last 3 Encounters:   23 88.9 kg (196 lb)   22 87.9 kg (193 lb 12.8 oz)   22 86.6 kg (191 lb)         BMI: Body mass index is 30.7 kg/m².       General:   alert, appears stated age, and cooperative   Neck: No thyromegaly or LAD   Abdomen: soft, non-tender, without masses or organomegaly   Breast: inspection negative, no nipple discharge or bleeding, no " masses or nodularity palpable   Urethra and bladder: urethral meatus normal; bladder nontender to palpation;   Vulva: normal, Bartholin's, Urethra, [place]'s normal   Vagina: normal mucosa, normal discharge   Cervix: multiparous appearance and no lesions   Uterus: normal size, non-tender, and mid plane    Adnexa: normal adnexa and no mass, fullness, tenderness       Assessment:    normal annual exam  Cervical and breast cancer screening     Plan:    Plan     [x]  Mammogram request made  [x]  PAP done  []  Labs:   []  GC/Chl/TV  []  DEXA scan   []  Referral for colonoscopy:       Counseling  [x]  Nutrition  [x]  Physical activity/regular exercise   [x]  Healthy weight  []  Injury prevention  []  Smoking cessation  []  Substance misuse/abuse  [x]  Sexual behavior  []  STD prevention  []  Contraception  []  Dental health  []  Mental health  []  Immunization  [x]  Encouraged SBE        Rissa Burton MD  11/14/2023  08:28 EST

## 2023-11-15 ENCOUNTER — OFFICE VISIT (OUTPATIENT)
Dept: FAMILY MEDICINE CLINIC | Facility: CLINIC | Age: 43
End: 2023-11-15
Payer: COMMERCIAL

## 2023-11-15 VITALS
SYSTOLIC BLOOD PRESSURE: 121 MMHG | HEART RATE: 53 BPM | WEIGHT: 195.6 LBS | OXYGEN SATURATION: 97 % | DIASTOLIC BLOOD PRESSURE: 87 MMHG | HEIGHT: 67 IN | BODY MASS INDEX: 30.7 KG/M2

## 2023-11-15 DIAGNOSIS — Z13.6 ENCOUNTER FOR LIPID SCREENING FOR CARDIOVASCULAR DISEASE: ICD-10-CM

## 2023-11-15 DIAGNOSIS — Z13.29 SCREENING FOR THYROID DISORDER: ICD-10-CM

## 2023-11-15 DIAGNOSIS — Z00.00 ENCOUNTER FOR WELLNESS EXAMINATION IN ADULT: Primary | ICD-10-CM

## 2023-11-15 DIAGNOSIS — Z83.49 FAMILY HISTORY OF HYPOTHYROIDISM: ICD-10-CM

## 2023-11-15 DIAGNOSIS — Z13.1 ENCOUNTER FOR SCREENING FOR DIABETES MELLITUS: ICD-10-CM

## 2023-11-15 DIAGNOSIS — Z13.220 ENCOUNTER FOR LIPID SCREENING FOR CARDIOVASCULAR DISEASE: ICD-10-CM

## 2023-11-15 PROCEDURE — 99396 PREV VISIT EST AGE 40-64: CPT | Performed by: FAMILY MEDICINE

## 2023-11-15 NOTE — PROGRESS NOTES
Subjective   Cele Alvarez is a 43 y.o. female who presents for annual female wellness exam.  Chief Complaint   Patient presents with    Annual Exam        Menstrual History: Uterine ablation in .  No longer having menstrual cycles.  Pregnancy History:   Sexual History: Monogamous male partner for the past 20 years  Contraception:  vasectomy  Diet: Healthy balanced.  Exercise: 1-2 times weekly walks  Do you feel safe? Yes  Have you ever been abused? No    Mammogram:  Normal 22  Per GYN  Pap Smear: 2023 per gyn no results yet.  Bone Density: NA  Colon Cancer Screening: NA, no FH of Colon CA      There is no immunization history on file for this patient.    The following portions of the patient's history were reviewed and updated as appropriate: allergies, current medications, past family history, past medical history, past social history, past surgical history and problem list.    Past Medical History:   Diagnosis Date    Acute upper respiratory infection     HISTORY    Allergic     Allergic rhinitis     Arthritis     Asthma     Breast lump in female     BENIGN    Breast pain     Encounter for preventive health examination     Family history of diabetes mellitus     Father    Family history of hypothyroidism     Mother    Fatigue     Former smoker     quit     Headache     Heavy menstrual bleeding     Iron deficiency anemia due to chronic blood loss     Low back pain     Low hemoglobin and low hematocrit     Overweight     Personal history of arthritis     Neck and shoulders    Screening for diabetes mellitus     Seizure     per pt no episode in an long time, not grand mal, no medications.    Sore throat     WITH SEASONAL ALLERGIES    Tendonitis of wrist, right     Thyroid disorder screening     Urinary tract infection, acute     HISTORY       Past Surgical History:   Procedure Laterality Date    D & C HYSTEROSCOPY ENDOMETRIAL ABLATION N/A 2020    Procedure: DILATATION AND CURETTAGE  HYSTEROSCOPY WITH NOVASURE ENDOMETRIAL ABLATION AND MYOSURE;  Surgeon: Rissa Burton MD;  Location: [facility] OR;  Service: Obstetrics/Gynecology;  Laterality: N/A;    ENDOMETRIAL ABLATION  2021    KNEE ARTHROSCOPY Right        Family History   Problem Relation Age of Onset    Hypothyroidism Mother     Hypertension Mother     Thyroid disease Mother     Diabetes Father     Hypertension Father     Pancreatitis Paternal Grandfather     Diabetes Paternal Grandfather     No Known Problems Other     Breast cancer Maternal Grandmother     Diabetes Paternal Uncle     Thyroid disease Sister     Ovarian cancer Neg Hx     Uterine cancer Neg Hx     Colon cancer Neg Hx     Malig Hyperthermia Neg Hx        Social History     Socioeconomic History    Marital status:      Spouse name: Titi    Number of children: 1   Tobacco Use    Smoking status: Former     Packs/day: 0.00     Years: 10.00     Additional pack years: 0.00     Total pack years: 0.00     Types: Cigarettes     Start date:      Quit date: 2012     Years since quittin.8    Smokeless tobacco: Never    Tobacco comments:     quit    Vaping Use    Vaping Use: Never used   Substance and Sexual Activity    Alcohol use: Yes     Comment: ONCE PER MONTH    Drug use: Never    Sexual activity: Yes     Partners: Male     Birth control/protection: Surgical     Comment: vasectomy       Review of Systems   Constitutional:  Negative for activity change, appetite change, fatigue and unexpected weight change.   HENT:  Negative for congestion, ear pain, nosebleeds, sore throat and tinnitus.    Eyes:  Negative for pain, redness and visual disturbance.   Respiratory:  Negative for cough, shortness of breath and wheezing.    Cardiovascular:  Negative for chest pain, palpitations and leg swelling.   Gastrointestinal:  Negative for abdominal pain, blood in stool and nausea.   Endocrine: Negative for polydipsia and polyuria.   Genitourinary:  Negative for  dysuria, frequency, menstrual problem and vaginal discharge.   Musculoskeletal:  Negative for arthralgias, joint swelling and myalgias.   Skin:  Negative for rash.   Allergic/Immunologic: Negative for environmental allergies, food allergies and immunocompromised state.   Neurological:  Negative for dizziness, speech difficulty, weakness and headaches.   Hematological:  Negative for adenopathy. Does not bruise/bleed easily.   Psychiatric/Behavioral:  Negative for decreased concentration and dysphoric mood. The patient is not nervous/anxious.        Objective   Vitals:    11/15/23 0803   BP: 121/87   Pulse: 53   SpO2: 97%     Body mass index is 30.64 kg/m².  Physical Exam  Vitals and nursing note reviewed.   Constitutional:       Appearance: She is well-developed.   HENT:      Head: Normocephalic and atraumatic.      Right Ear: External ear normal.      Left Ear: External ear normal.      Nose: Nose normal.   Eyes:      General: No scleral icterus.     Conjunctiva/sclera: Conjunctivae normal.   Cardiovascular:      Rate and Rhythm: Normal rate and regular rhythm.      Heart sounds: Normal heart sounds.   Pulmonary:      Effort: Pulmonary effort is normal.      Breath sounds: Normal breath sounds.   Musculoskeletal:      Cervical back: Normal range of motion and neck supple.   Skin:     General: Skin is warm and dry.      Findings: No rash.   Neurological:      Mental Status: She is alert and oriented to person, place, and time.   Psychiatric:         Mood and Affect: Mood normal.         Behavior: Behavior normal.         Thought Content: Thought content normal.         Judgment: Judgment normal.           Assessment & Plan   Diagnoses and all orders for this visit:    1. Encounter for wellness examination in adult (Primary)    2. Encounter for screening for diabetes mellitus  -     Comprehensive Metabolic Panel    3. Encounter for lipid screening for cardiovascular disease  -     Lipid Panel    4. Screening for  thyroid disorder  -     TSH    5. Family history of hypothyroidism  -     TSH      RHM.  Lipids today.  Screening for diabetes with CMP.  Screening for hypothyroidism since patient has strong family history today.  Pap smear and mammogram is up-to-date per GYN.      Discussed the importance of maintaining a healthy weight and getting regular exercise.  Educated patient on the benefits of healthy diet.  Advise follow-up annually for wellness exams.      There are no Patient Instructions on file for this visit.

## 2023-11-16 LAB
CHOLEST SERPL-MCNC: 175 MG/DL (ref 0–200)
CYTOLOGIST CVX/VAG CYTO: NORMAL
CYTOLOGY CVX/VAG DOC CYTO: NORMAL
CYTOLOGY CVX/VAG DOC THIN PREP: NORMAL
DX ICD CODE: NORMAL
HDLC SERPL-MCNC: 33 MG/DL (ref 40–60)
HIV 1 & 2 AB SER-IMP: NORMAL
HPV I/H RISK 4 DNA CVX QL PROBE+SIG AMP: NEGATIVE
LDLC SERPL CALC-MCNC: 124 MG/DL (ref 0–100)
OTHER STN SPEC: NORMAL
STAT OF ADQ CVX/VAG CYTO-IMP: NORMAL
TRIGL SERPL-MCNC: 95 MG/DL (ref 0–150)
TSH SERPL DL<=0.005 MIU/L-ACNC: 0.88 UIU/ML (ref 0.27–4.2)
VLDLC SERPL CALC-MCNC: 18 MG/DL (ref 5–40)

## 2023-12-14 ENCOUNTER — HOSPITAL ENCOUNTER (OUTPATIENT)
Dept: MAMMOGRAPHY | Facility: HOSPITAL | Age: 43
Discharge: HOME | End: 2023-12-14
Admitting: OBSTETRICS & GYNECOLOGY
Payer: COMMERCIAL

## 2023-12-14 DIAGNOSIS — Z12.31 ENCOUNTER FOR SCREENING MAMMOGRAM FOR MALIGNANT NEOPLASM OF BREAST: ICD-10-CM

## 2023-12-14 PROCEDURE — 77063 BREAST TOMOSYNTHESIS BI: CPT

## 2023-12-14 PROCEDURE — 77067 SCR MAMMO BI INCL CAD: CPT

## 2024-11-04 ENCOUNTER — E-VISIT (OUTPATIENT)
Dept: ADMINISTRATIVE | Facility: OTHER | Age: 44
End: 2024-11-04
Payer: COMMERCIAL

## 2024-11-04 NOTE — E-VISIT ESCALATED
Status: Referred Out  Date: 2024 14:09:42  Acuity Level: Within 24 hours  Referral message:  We're sorry you are not feeling well. Your safety is important to us. Because you've had symptoms for a week or more, it is possible the infection could have spread beyond the bladder. For this reason, we would like for you to be seen   in person to determine the location of the infection and most effective treatment for you.  For the most appropriate care, please be seen:   At a clinic or urgent care  Within 24 hours    You won't be charged for this visit. We hope you feel better soon!     Patient: Cele Alvarez  Patient : 1980  Patient Address: [address] [place] [address]  Patient Phone: [phone]  Clinician Response: Unavailable  Diagnosis: Unavailable  Diagnosis ICD: Unavailable     Patient Interview Questions and Responses:  Clinical Protocol: UTI  Please select the reason for your visit today.: Urinary tract infection (UTI)  When did your symptoms start?: 7 or more days ago

## 2024-11-15 ENCOUNTER — OFFICE VISIT (OUTPATIENT)
Dept: FAMILY MEDICINE CLINIC | Facility: CLINIC | Age: 44
End: 2024-11-15
Payer: COMMERCIAL

## 2024-11-15 VITALS
BODY MASS INDEX: 27.72 KG/M2 | WEIGHT: 176.6 LBS | OXYGEN SATURATION: 99 % | HEIGHT: 67 IN | SYSTOLIC BLOOD PRESSURE: 92 MMHG | DIASTOLIC BLOOD PRESSURE: 60 MMHG | HEART RATE: 70 BPM

## 2024-11-15 DIAGNOSIS — Z00.00 ENCOUNTER FOR WELLNESS EXAMINATION IN ADULT: Primary | ICD-10-CM

## 2024-11-15 DIAGNOSIS — Z13.220 ENCOUNTER FOR LIPID SCREENING FOR CARDIOVASCULAR DISEASE: ICD-10-CM

## 2024-11-15 DIAGNOSIS — Z13.6 ENCOUNTER FOR LIPID SCREENING FOR CARDIOVASCULAR DISEASE: ICD-10-CM

## 2024-11-15 DIAGNOSIS — Z13.1 ENCOUNTER FOR SCREENING FOR DIABETES MELLITUS: ICD-10-CM

## 2024-11-15 DIAGNOSIS — R30.0 DYSURIA: ICD-10-CM

## 2024-11-15 DIAGNOSIS — Z11.59 NEED FOR HEPATITIS C SCREENING TEST: ICD-10-CM

## 2024-11-15 LAB
BILIRUB BLD-MCNC: NEGATIVE MG/DL
CLARITY, POC: ABNORMAL
COLOR UR: YELLOW
EXPIRATION DATE: ABNORMAL
GLUCOSE UR STRIP-MCNC: NEGATIVE MG/DL
KETONES UR QL: NEGATIVE
LEUKOCYTE EST, POC: ABNORMAL
Lab: ABNORMAL
NITRITE UR-MCNC: POSITIVE MG/ML
PH UR: 6 [PH] (ref 5–8)
PROT UR STRIP-MCNC: ABNORMAL MG/DL
RBC # UR STRIP: ABNORMAL /UL
SP GR UR: 1.02 (ref 1–1.03)
UROBILINOGEN UR QL: NORMAL

## 2024-11-15 RX ORDER — NITROFURANTOIN 25; 75 MG/1; MG/1
100 CAPSULE ORAL 2 TIMES DAILY
Qty: 14 CAPSULE | Refills: 0 | Status: SHIPPED | OUTPATIENT
Start: 2024-11-15

## 2024-11-15 NOTE — PROGRESS NOTES
Subjective   Cele Alvarez is a 44 y.o. female who presents for annual female wellness exam.  Chief Complaint   Patient presents with    Annual Exam     Patient is also here for new problem.  She started noticing urinary frequency, burning with urination intermittently about 3 weeks ago.  It has gotten some better but it has not gone away.  She denies any fevers chills, nausea or vomiting.  Otherwise she generally feels well.  Denies vaginal discharge.  She has had a UTI before and feels that this is similar.     Menstrual History: Uterine ablation in .  No longer having menstrual cycles.  Pregnancy History:   Sexual History: Monogamous male partner for the past 21 years  Contraception:  vasectomy  Diet: Healthy balanced.  Exercise: 1-2 times weekly walks  Do you feel safe? Yes  Have you ever been abused? No    Mammogram:  Normal 23 Per GYN  Pap Smear: 2023 per gyn. pap and HPV negative  Bone Density: NA  Colon Cancer Screening: NA, no FH of Colon CA      There is no immunization history on file for this patient.    The following portions of the patient's history were reviewed and updated as appropriate: allergies, current medications, past family history, past medical history, past social history, past surgical history and problem list.    Past Medical History:   Diagnosis Date    Acute upper respiratory infection     HISTORY    Allergic     Allergic rhinitis     Arthritis     Asthma     Breast lump in female     BENIGN    Breast pain     Encounter for preventive health examination     Family history of diabetes mellitus     Father    Family history of hypothyroidism     Mother    Fatigue     Former smoker     quit     Headache     Heavy menstrual bleeding     Iron deficiency anemia due to chronic blood loss     Low back pain     Low hemoglobin and low hematocrit     Overweight     Personal history of arthritis     Neck and shoulders    Screening for diabetes mellitus     Seizure      per pt no episode in an long time, not grand mal, no medications.    Sore throat     WITH SEASONAL ALLERGIES    Tendonitis of wrist, right     Thyroid disorder screening     Urinary tract infection, acute     HISTORY       Past Surgical History:   Procedure Laterality Date    D & C HYSTEROSCOPY ENDOMETRIAL ABLATION N/A 2020    Procedure: DILATATION AND CURETTAGE HYSTEROSCOPY WITH NOVASURE ENDOMETRIAL ABLATION AND MYOSURE;  Surgeon: Rissa Burton MD;  Location: [facility] OR;  Service: Obstetrics/Gynecology;  Laterality: N/A;    ENDOMETRIAL ABLATION  2021    KNEE ARTHROSCOPY Right        Family History   Problem Relation Age of Onset    Hypothyroidism Mother     Hypertension Mother     Thyroid disease Mother     Diabetes Father     Hypertension Father     Pancreatitis Paternal Grandfather     Diabetes Paternal Grandfather     No Known Problems Other     Breast cancer Maternal Grandmother     Diabetes Paternal Uncle     Thyroid disease Sister     Miscarriages / Stillbirths Sister     Ovarian cancer Neg Hx     Uterine cancer Neg Hx     Colon cancer Neg Hx     Malig Hyperthermia Neg Hx        Social History     Socioeconomic History    Marital status:      Spouse name: Titi    Number of children: 1   Tobacco Use    Smoking status: Former     Current packs/day: 0.00     Types: Cigarettes     Start date:      Quit date: 2012     Years since quittin.8    Smokeless tobacco: Never    Tobacco comments:     quit    Vaping Use    Vaping status: Never Used   Substance and Sexual Activity    Alcohol use: Yes     Comment: ONCE PER MONTH    Drug use: Never    Sexual activity: Yes     Partners: Male     Birth control/protection: Surgical     Comment: vasectomy       Review of Systems   Constitutional:  Negative for activity change, appetite change, fatigue and unexpected weight change.   HENT:  Negative for congestion, ear pain, nosebleeds, sore throat and tinnitus.    Eyes:  Negative for  pain, redness and visual disturbance.   Respiratory:  Negative for cough, shortness of breath and wheezing.    Cardiovascular:  Negative for chest pain, palpitations and leg swelling.   Gastrointestinal:  Negative for abdominal pain, blood in stool and nausea.   Endocrine: Negative for polydipsia and polyuria.   Genitourinary:  Negative for dysuria, frequency, menstrual problem and vaginal discharge.   Musculoskeletal:  Negative for arthralgias, joint swelling and myalgias.   Skin:  Negative for rash.   Allergic/Immunologic: Negative for environmental allergies, food allergies and immunocompromised state.   Neurological:  Negative for dizziness, speech difficulty, weakness and headaches.   Hematological:  Negative for adenopathy. Does not bruise/bleed easily.   Psychiatric/Behavioral:  Negative for decreased concentration and dysphoric mood. The patient is not nervous/anxious.        Objective   Vitals:    11/15/24 0808   BP: 92/60   Pulse: 70   SpO2: 99%     Body mass index is 27.65 kg/m².  Physical Exam  Vitals and nursing note reviewed.   Constitutional:       Appearance: She is well-developed.   HENT:      Head: Normocephalic and atraumatic.   Eyes:      Conjunctiva/sclera: Conjunctivae normal.   Cardiovascular:      Rate and Rhythm: Normal rate and regular rhythm.      Heart sounds: Normal heart sounds.   Pulmonary:      Effort: Pulmonary effort is normal.      Breath sounds: Normal breath sounds.   Abdominal:      General: Bowel sounds are normal.      Palpations: Abdomen is soft.   Musculoskeletal:         General: Normal range of motion.      Cervical back: Normal range of motion and neck supple.   Skin:     General: Skin is warm and dry.      Capillary Refill: Capillary refill takes less than 2 seconds.      Findings: No rash.   Neurological:      Mental Status: She is alert and oriented to person, place, and time.   Psychiatric:         Behavior: Behavior normal.         Thought Content: Thought content  normal.         Judgment: Judgment normal.         Assessment & Plan   Diagnoses and all orders for this visit:    1. Encounter for wellness examination in adult (Primary)    2. Encounter for lipid screening for cardiovascular disease  -     Lipid Panel    3. Encounter for screening for diabetes mellitus  -     Comprehensive Metabolic Panel    4. Need for hepatitis C screening test  -     Hepatitis C Antibody    5. Dysuria  -     POC Urinalysis Dipstick, Automated  -     Urine Culture - Urine, Urine, Clean Catch  -     nitrofurantoin, macrocrystal-monohydrate, (Macrobid) 100 MG capsule; Take 1 capsule by mouth 2 (Two) Times a Day.  Dispense: 14 capsule; Refill: 0      RHM.  GYN performs Pap and mammogram.  Hepatitis C and lipid screening today.  CMP also.  Vaccine recommendations discussed.    Patient is also here for new problem of dysuria.  Urinalysis shows nitrites, blood and leukocytes.  Urine culture will be sent.  Empiric treatment with Macrobid as above.  If symptoms are worsening patient was advised to follow-up.    Discussed the importance of maintaining a healthy weight and getting regular exercise.  Educated patient on the benefits of healthy diet.  Advise follow-up annually for wellness exams.      There are no Patient Instructions on file for this visit.

## 2024-11-16 LAB
ALBUMIN SERPL-MCNC: 4.4 G/DL (ref 3.5–5.2)
ALBUMIN/GLOB SERPL: 1.8 G/DL
ALP SERPL-CCNC: 34 U/L (ref 39–117)
ALT SERPL-CCNC: 13 U/L (ref 1–33)
AST SERPL-CCNC: 17 U/L (ref 1–32)
BILIRUB SERPL-MCNC: 0.4 MG/DL (ref 0–1.2)
BUN SERPL-MCNC: 15 MG/DL (ref 6–20)
BUN/CREAT SERPL: 19 (ref 7–25)
CALCIUM SERPL-MCNC: 9.3 MG/DL (ref 8.6–10.5)
CHLORIDE SERPL-SCNC: 103 MMOL/L (ref 98–107)
CHOLEST SERPL-MCNC: 188 MG/DL (ref 0–200)
CO2 SERPL-SCNC: 27 MMOL/L (ref 22–29)
CREAT SERPL-MCNC: 0.79 MG/DL (ref 0.57–1)
EGFRCR SERPLBLD CKD-EPI 2021: 95.3 ML/MIN/1.73
GLOBULIN SER CALC-MCNC: 2.4 GM/DL
GLUCOSE SERPL-MCNC: 94 MG/DL (ref 65–99)
HCV IGG SERPL QL IA: NON REACTIVE
HDLC SERPL-MCNC: 46 MG/DL (ref 40–60)
LDLC SERPL CALC-MCNC: 126 MG/DL (ref 0–100)
POTASSIUM SERPL-SCNC: 4.6 MMOL/L (ref 3.5–5.2)
PROT SERPL-MCNC: 6.8 G/DL (ref 6–8.5)
SODIUM SERPL-SCNC: 139 MMOL/L (ref 136–145)
TRIGL SERPL-MCNC: 89 MG/DL (ref 0–150)
VLDLC SERPL CALC-MCNC: 16 MG/DL (ref 5–40)

## 2024-11-19 LAB
BACTERIA UR CULT: ABNORMAL
BACTERIA UR CULT: ABNORMAL
OTHER ANTIBIOTIC SUSC ISLT: ABNORMAL

## 2024-11-26 ENCOUNTER — OFFICE VISIT (OUTPATIENT)
Dept: OBSTETRICS AND GYNECOLOGY | Age: 44
End: 2024-11-26
Payer: COMMERCIAL

## 2024-11-26 VITALS
HEIGHT: 67 IN | DIASTOLIC BLOOD PRESSURE: 76 MMHG | SYSTOLIC BLOOD PRESSURE: 122 MMHG | BODY MASS INDEX: 27.94 KG/M2 | WEIGHT: 178 LBS

## 2024-11-26 DIAGNOSIS — Z01.419 ENCOUNTER FOR GYNECOLOGICAL EXAMINATION WITHOUT ABNORMAL FINDING: ICD-10-CM

## 2024-11-26 DIAGNOSIS — Z12.31 ENCOUNTER FOR SCREENING MAMMOGRAM FOR MALIGNANT NEOPLASM OF BREAST: Primary | ICD-10-CM

## 2024-11-26 NOTE — PROGRESS NOTES
Routine Annual Visit    2024    Patient: Cele Alvarez          MR#:[id number]      Chief Complaint   Patient presents with    Gynecologic Exam     Annual, last pap 24 neg, hpv neg, mammo 23 neg       History of Present Illness    44 y.o. female  who presents for annual exam.   No hot flashes, night sweats  Happy with ablation, not having menses      No LMP recorded (lmp unknown). Patient has had an ablation.  Obstetric History:  OB History          1    Para   1    Term   1            AB        Living   1         SAB        IAB        Ectopic        Molar        Multiple        Live Births   1               Menstrual History:     No LMP recorded (lmp unknown). Patient has had an ablation.       ________________________________________  Patient Active Problem List   Diagnosis    Abnormal uterine bleeding    Abnormal uterine bleeding (AUB)    S/P endometrial ablation       Past Medical History:   Diagnosis Date    Acute upper respiratory infection     HISTORY    Allergic     Allergic rhinitis     Arthritis     Asthma     Breast lump in female     BENIGN    Breast pain     Encounter for preventive health examination     Family history of diabetes mellitus     Father    Family history of hypothyroidism     Mother    Fatigue     Former smoker     quit     Headache     Heavy menstrual bleeding     Iron deficiency anemia due to chronic blood loss     Low back pain     Low hemoglobin and low hematocrit     Migraine     Overweight     Personal history of arthritis     Neck and shoulders    Preeclampsia     Screening for diabetes mellitus     Seizure     per pt no episode in an long time, not grand mal, no medications.    Sore throat     WITH SEASONAL ALLERGIES    Tendonitis of wrist, right     Thyroid disorder screening     Urinary tract infection, acute     HISTORY       Family History   Problem Relation Age of Onset    Hypothyroidism Mother     Hypertension Mother     Thyroid  "disease Mother     Diabetes Father     Hypertension Father     Pancreatitis Paternal Grandfather     Diabetes Paternal Grandfather     No Known Problems Other     Breast cancer Maternal Grandmother     Diabetes Paternal Uncle     Thyroid disease Sister     Miscarriages / Stillbirths Sister     Ovarian cancer Neg Hx     Uterine cancer Neg Hx     Colon cancer Neg Hx     Malig Hyperthermia Neg Hx        Past Surgical History:   Procedure Laterality Date    D & C HYSTEROSCOPY ENDOMETRIAL ABLATION N/A 2020    Procedure: DILATATION AND CURETTAGE HYSTEROSCOPY WITH NOVASURE ENDOMETRIAL ABLATION AND MYOSURE;  Surgeon: Rissa Burton MD;  Location: [facility];  Service: Obstetrics/Gynecology;  Laterality: N/A;    ENDOMETRIAL ABLATION  2021    KNEE ARTHROSCOPY Right        Social History     Tobacco Use   Smoking Status Former    Current packs/day: 0.00    Types: Cigarettes    Start date:     Quit date: 2012    Years since quittin.9   Smokeless Tobacco Never   Tobacco Comments    quit        has a current medication list which includes the following prescription(s): nurtec.  ________________________________________        Objective   Physical Exam    /76   Ht 170.2 cm (67\")   Wt 80.7 kg (178 lb)   LMP  (LMP Unknown)   BMI 27.88 kg/m²    BP Readings from Last 3 Encounters:   24 122/76   11/15/24 92/60   11/15/23 121/87      Wt Readings from Last 3 Encounters:   24 80.7 kg (178 lb)   11/15/24 80.1 kg (176 lb 9.6 oz)   11/15/23 88.7 kg (195 lb 9.6 oz)         BMI: Body mass index is 27.88 kg/m².       General:   alert, appears stated age, and cooperative   Neck: No thyromegaly or LAD   Abdomen: soft, non-tender, without masses or organomegaly   Breast: inspection negative, no nipple discharge or bleeding, no masses or nodularity palpable   Urethra and bladder: urethral meatus normal; bladder nontender to palpation;   Vulva: normal, Bartholin's, Urethra, [place]'s normal "   Vagina: normal mucosa, normal discharge   Cervix: multiparous appearance and no lesions   Uterus: normal size, non-tender, and mid plane    Adnexa: normal adnexa and no mass, fullness, tenderness       Assessment:    normal annual exam   Hx endometrial ablation    Plan:    Plan     [x]  Mammogram request made  []  PAP done UTD  []  Labs:   []  GC/Chl/TV  []  DEXA scan   []  Referral for colonoscopy:       Counseling  [x]  Nutrition  [x]  Physical activity/regular exercise   [x]  Healthy weight  []  Injury prevention  []  Smoking cessation  []  Substance misuse/abuse  [x]  Sexual behavior  []  STD prevention  []  Contraception  []  Dental health  []  Mental health  []  Immunization  [x]  Encouraged SBE        Rissa Burton MD  11/26/2024  08:25 EST

## 2025-02-06 ENCOUNTER — HOSPITAL ENCOUNTER (OUTPATIENT)
Dept: MAMMOGRAPHY | Facility: HOSPITAL | Age: 45
Discharge: HOME | End: 2025-02-06
Admitting: OBSTETRICS & GYNECOLOGY
Payer: COMMERCIAL

## 2025-02-06 DIAGNOSIS — Z12.31 ENCOUNTER FOR SCREENING MAMMOGRAM FOR MALIGNANT NEOPLASM OF BREAST: ICD-10-CM

## 2025-02-06 PROCEDURE — 77067 SCR MAMMO BI INCL CAD: CPT

## 2025-02-06 PROCEDURE — 77063 BREAST TOMOSYNTHESIS BI: CPT

## (undated) DEVICE — 1000ML,PRESSURE INFUSER W/STOPCOCK: Brand: MEDLINE

## (undated) DEVICE — LOU D & C HYSTEROSCOPY: Brand: MEDLINE INDUSTRIES, INC.

## (undated) DEVICE — SEAL HYSTERSCOPE/OUTFLOW CHANNEL MYOSURE

## (undated) DEVICE — ST IRR CYSTO W/SPK 77IN LF

## (undated) DEVICE — STRAP STIRUP WO/ RNG

## (undated) DEVICE — ST TBG ENDOMAT HYSTSCPY

## (undated) DEVICE — PROB ABL ENDOMTRL NOVASURE/G4 W/SURESND

## (undated) DEVICE — GLV SURG BIOGEL LTX PF 6 1/2

## (undated) DEVICE — SOL NACL 0.9PCT 1000ML

## (undated) DEVICE — DRAPE,REIN 53X77,STERILE: Brand: MEDLINE

## (undated) DEVICE — NEEDLE, QUINCKE, 20GX3.5": Brand: MEDLINE

## (undated) DEVICE — DRAPE,UNDERBUTTOCKS,PCH,STERILE: Brand: MEDLINE

## (undated) DEVICE — SOL IRR NACL 0.9PCT 3000ML